# Patient Record
Sex: FEMALE | Race: WHITE | NOT HISPANIC OR LATINO | URBAN - METROPOLITAN AREA
[De-identification: names, ages, dates, MRNs, and addresses within clinical notes are randomized per-mention and may not be internally consistent; named-entity substitution may affect disease eponyms.]

---

## 2017-11-24 ENCOUNTER — OUTPATIENT (OUTPATIENT)
Dept: OUTPATIENT SERVICES | Facility: HOSPITAL | Age: 82
LOS: 1 days | Discharge: HOME | End: 2017-11-24
Payer: MEDICARE

## 2017-11-24 VITALS
RESPIRATION RATE: 17 BRPM | DIASTOLIC BLOOD PRESSURE: 66 MMHG | HEIGHT: 63 IN | WEIGHT: 123.02 LBS | HEART RATE: 65 BPM | OXYGEN SATURATION: 97 % | SYSTOLIC BLOOD PRESSURE: 119 MMHG | TEMPERATURE: 98 F

## 2017-11-24 DIAGNOSIS — S72.142D DISPLACED INTERTROCHANTERIC FRACTURE OF LEFT FEMUR, SUBSEQUENT ENCOUNTER FOR CLOSED FRACTURE WITH ROUTINE HEALING: ICD-10-CM

## 2017-11-24 DIAGNOSIS — Z95.828 PRESENCE OF OTHER VASCULAR IMPLANTS AND GRAFTS: Chronic | ICD-10-CM

## 2017-11-24 DIAGNOSIS — Z90.710 ACQUIRED ABSENCE OF BOTH CERVIX AND UTERUS: Chronic | ICD-10-CM

## 2017-11-24 DIAGNOSIS — Z01.818 ENCOUNTER FOR OTHER PREPROCEDURAL EXAMINATION: ICD-10-CM

## 2017-11-24 DIAGNOSIS — Z98.1 ARTHRODESIS STATUS: Chronic | ICD-10-CM

## 2017-11-24 DIAGNOSIS — M25.552 PAIN IN LEFT HIP: Chronic | ICD-10-CM

## 2017-11-24 LAB
ANION GAP SERPL CALC-SCNC: 5 MMOL/L — SIGNIFICANT CHANGE UP (ref 5–17)
APTT BLD: 27.4 SEC — LOW (ref 27.5–37.4)
BASOPHILS # BLD AUTO: 0 K/UL — SIGNIFICANT CHANGE UP (ref 0–0.2)
BASOPHILS NFR BLD AUTO: 0.7 % — SIGNIFICANT CHANGE UP (ref 0–2)
BUN SERPL-MCNC: 26 MG/DL — HIGH (ref 7–23)
CALCIUM SERPL-MCNC: 8.9 MG/DL — SIGNIFICANT CHANGE UP (ref 8.5–10.1)
CHLORIDE SERPL-SCNC: 103 MMOL/L — SIGNIFICANT CHANGE UP (ref 96–108)
CO2 SERPL-SCNC: 31 MMOL/L — SIGNIFICANT CHANGE UP (ref 22–31)
CREAT SERPL-MCNC: 0.64 MG/DL — SIGNIFICANT CHANGE UP (ref 0.5–1.3)
EOSINOPHIL # BLD AUTO: 0.1 K/UL — SIGNIFICANT CHANGE UP (ref 0–0.5)
EOSINOPHIL NFR BLD AUTO: 1.2 % — SIGNIFICANT CHANGE UP (ref 0–6)
GLUCOSE SERPL-MCNC: 84 MG/DL — SIGNIFICANT CHANGE UP (ref 70–99)
HBA1C BLD-MCNC: 5.2 % — SIGNIFICANT CHANGE UP (ref 4–5.6)
HCT VFR BLD CALC: 38.7 % — SIGNIFICANT CHANGE UP (ref 34.5–45)
HGB BLD-MCNC: 12.6 G/DL — SIGNIFICANT CHANGE UP (ref 11.5–15.5)
INR BLD: 0.97 RATIO — SIGNIFICANT CHANGE UP (ref 0.88–1.16)
LYMPHOCYTES # BLD AUTO: 1.3 K/UL — SIGNIFICANT CHANGE UP (ref 1–3.3)
LYMPHOCYTES # BLD AUTO: 17.4 % — SIGNIFICANT CHANGE UP (ref 13–44)
MCHC RBC-ENTMCNC: 32.5 GM/DL — SIGNIFICANT CHANGE UP (ref 32–36)
MCHC RBC-ENTMCNC: 33.3 PG — SIGNIFICANT CHANGE UP (ref 27–34)
MCV RBC AUTO: 102.5 FL — HIGH (ref 80–100)
MONOCYTES # BLD AUTO: 0.8 K/UL — SIGNIFICANT CHANGE UP (ref 0–0.9)
MONOCYTES NFR BLD AUTO: 11.5 % — SIGNIFICANT CHANGE UP (ref 2–14)
NEUTROPHILS # BLD AUTO: 5 K/UL — SIGNIFICANT CHANGE UP (ref 1.8–7.4)
NEUTROPHILS NFR BLD AUTO: 69.2 % — SIGNIFICANT CHANGE UP (ref 43–77)
PLATELET # BLD AUTO: 286 K/UL — SIGNIFICANT CHANGE UP (ref 150–400)
POTASSIUM SERPL-MCNC: 4.3 MMOL/L — SIGNIFICANT CHANGE UP (ref 3.5–5.3)
POTASSIUM SERPL-SCNC: 4.3 MMOL/L — SIGNIFICANT CHANGE UP (ref 3.5–5.3)
PROTHROM AB SERPL-ACNC: 10.6 SEC — SIGNIFICANT CHANGE UP (ref 9.8–12.7)
RBC # BLD: 3.77 M/UL — LOW (ref 3.8–5.2)
RBC # FLD: 12.1 % — SIGNIFICANT CHANGE UP (ref 11–15)
SODIUM SERPL-SCNC: 139 MMOL/L — SIGNIFICANT CHANGE UP (ref 135–145)
WBC # BLD: 7.2 K/UL — SIGNIFICANT CHANGE UP (ref 3.8–10.5)
WBC # FLD AUTO: 7.2 K/UL — SIGNIFICANT CHANGE UP (ref 3.8–10.5)

## 2017-11-24 PROCEDURE — 93010 ELECTROCARDIOGRAM REPORT: CPT | Mod: NC

## 2017-11-24 RX ORDER — MUPIROCIN 20 MG/G
1 OINTMENT TOPICAL
Qty: 1 | Refills: 0 | OUTPATIENT
Start: 2017-11-24 | End: 2017-11-29

## 2017-11-24 NOTE — H&P PST ADULT - NSANTHOSAYNRD_GEN_A_CORE
No. HERMILA screening performed.  STOP BANG Legend: 0-2 = LOW Risk; 3-4 = INTERMEDIATE Risk; 5-8 = HIGH Risk

## 2017-11-24 NOTE — PATIENT PROFILE ADULT. - PSH
H/O arterial bypass of lower limb    Pain of left hip  left hip pinning  may 19.2017  S/P cervical spinal fusion    Status post vaginal hysterectomy  1970

## 2017-11-25 ENCOUNTER — INPATIENT (INPATIENT)
Facility: HOSPITAL | Age: 82
LOS: 5 days | Discharge: NURSING, REHAB OR LONG-TERM CARE | End: 2017-12-01
Attending: ORTHOPAEDIC SURGERY | Admitting: ORTHOPAEDIC SURGERY
Payer: MEDICARE

## 2017-11-25 VITALS
OXYGEN SATURATION: 97 % | DIASTOLIC BLOOD PRESSURE: 60 MMHG | RESPIRATION RATE: 19 BRPM | TEMPERATURE: 98 F | HEIGHT: 63 IN | SYSTOLIC BLOOD PRESSURE: 111 MMHG | WEIGHT: 125 LBS | HEART RATE: 69 BPM

## 2017-11-25 DIAGNOSIS — Z95.828 PRESENCE OF OTHER VASCULAR IMPLANTS AND GRAFTS: Chronic | ICD-10-CM

## 2017-11-25 DIAGNOSIS — Z98.1 ARTHRODESIS STATUS: Chronic | ICD-10-CM

## 2017-11-25 DIAGNOSIS — Z90.710 ACQUIRED ABSENCE OF BOTH CERVIX AND UTERUS: Chronic | ICD-10-CM

## 2017-11-25 DIAGNOSIS — M25.552 PAIN IN LEFT HIP: Chronic | ICD-10-CM

## 2017-11-25 LAB
ALBUMIN SERPL ELPH-MCNC: 3.2 G/DL — LOW (ref 3.3–5)
ALP SERPL-CCNC: 63 U/L — SIGNIFICANT CHANGE UP (ref 40–120)
ALT FLD-CCNC: 32 U/L — SIGNIFICANT CHANGE UP (ref 12–78)
ANION GAP SERPL CALC-SCNC: 5 MMOL/L — SIGNIFICANT CHANGE UP (ref 5–17)
APTT BLD: 27 SEC — LOW (ref 27.5–37.4)
AST SERPL-CCNC: 23 U/L — SIGNIFICANT CHANGE UP (ref 15–37)
BASOPHILS # BLD AUTO: 0 K/UL — SIGNIFICANT CHANGE UP (ref 0–0.2)
BASOPHILS NFR BLD AUTO: 0.6 % — SIGNIFICANT CHANGE UP (ref 0–2)
BILIRUB SERPL-MCNC: 0.3 MG/DL — SIGNIFICANT CHANGE UP (ref 0.2–1.2)
BLD GP AB SCN SERPL QL: SIGNIFICANT CHANGE UP
BUN SERPL-MCNC: 19 MG/DL — SIGNIFICANT CHANGE UP (ref 7–23)
CALCIUM SERPL-MCNC: 8.9 MG/DL — SIGNIFICANT CHANGE UP (ref 8.5–10.1)
CHLORIDE SERPL-SCNC: 103 MMOL/L — SIGNIFICANT CHANGE UP (ref 96–108)
CO2 SERPL-SCNC: 32 MMOL/L — HIGH (ref 22–31)
CREAT SERPL-MCNC: 0.72 MG/DL — SIGNIFICANT CHANGE UP (ref 0.5–1.3)
EOSINOPHIL # BLD AUTO: 0.1 K/UL — SIGNIFICANT CHANGE UP (ref 0–0.5)
EOSINOPHIL NFR BLD AUTO: 1.1 % — SIGNIFICANT CHANGE UP (ref 0–6)
GLUCOSE SERPL-MCNC: 112 MG/DL — HIGH (ref 70–99)
HCT VFR BLD CALC: 37.6 % — SIGNIFICANT CHANGE UP (ref 34.5–45)
HGB BLD-MCNC: 12.6 G/DL — SIGNIFICANT CHANGE UP (ref 11.5–15.5)
INR BLD: 1.03 RATIO — SIGNIFICANT CHANGE UP (ref 0.88–1.16)
LYMPHOCYTES # BLD AUTO: 1.1 K/UL — SIGNIFICANT CHANGE UP (ref 1–3.3)
LYMPHOCYTES # BLD AUTO: 16.1 % — SIGNIFICANT CHANGE UP (ref 13–44)
MCHC RBC-ENTMCNC: 33.4 GM/DL — SIGNIFICANT CHANGE UP (ref 32–36)
MCHC RBC-ENTMCNC: 33.6 PG — SIGNIFICANT CHANGE UP (ref 27–34)
MCV RBC AUTO: 100.6 FL — HIGH (ref 80–100)
MONOCYTES # BLD AUTO: 0.6 K/UL — SIGNIFICANT CHANGE UP (ref 0–0.9)
MONOCYTES NFR BLD AUTO: 8.7 % — SIGNIFICANT CHANGE UP (ref 2–14)
MRSA PCR RESULT.: DETECTED
NEUTROPHILS # BLD AUTO: 4.9 K/UL — SIGNIFICANT CHANGE UP (ref 1.8–7.4)
NEUTROPHILS NFR BLD AUTO: 73.4 % — SIGNIFICANT CHANGE UP (ref 43–77)
PLATELET # BLD AUTO: 303 K/UL — SIGNIFICANT CHANGE UP (ref 150–400)
POTASSIUM SERPL-MCNC: 3.9 MMOL/L — SIGNIFICANT CHANGE UP (ref 3.5–5.3)
POTASSIUM SERPL-SCNC: 3.9 MMOL/L — SIGNIFICANT CHANGE UP (ref 3.5–5.3)
PROT SERPL-MCNC: 6.8 GM/DL — SIGNIFICANT CHANGE UP (ref 6–8.3)
PROTHROM AB SERPL-ACNC: 11.2 SEC — SIGNIFICANT CHANGE UP (ref 9.8–12.7)
RBC # BLD: 3.73 M/UL — LOW (ref 3.8–5.2)
RBC # FLD: 11.9 % — SIGNIFICANT CHANGE UP (ref 11–15)
S AUREUS DNA NOSE QL NAA+PROBE: DETECTED
SODIUM SERPL-SCNC: 140 MMOL/L — SIGNIFICANT CHANGE UP (ref 135–145)
WBC # BLD: 6.7 K/UL — SIGNIFICANT CHANGE UP (ref 3.8–10.5)
WBC # FLD AUTO: 6.7 K/UL — SIGNIFICANT CHANGE UP (ref 3.8–10.5)

## 2017-11-25 PROCEDURE — 71010: CPT | Mod: 26

## 2017-11-25 PROCEDURE — 99285 EMERGENCY DEPT VISIT HI MDM: CPT

## 2017-11-25 PROCEDURE — 73502 X-RAY EXAM HIP UNI 2-3 VIEWS: CPT | Mod: 26,LT

## 2017-11-25 RX ORDER — TRAMADOL HYDROCHLORIDE 50 MG/1
1 TABLET ORAL
Qty: 0 | Refills: 0 | COMMUNITY

## 2017-11-25 RX ORDER — TRAMADOL HYDROCHLORIDE 50 MG/1
0 TABLET ORAL
Qty: 0 | Refills: 0 | COMMUNITY

## 2017-11-25 RX ORDER — ENOXAPARIN SODIUM 100 MG/ML
40 INJECTION SUBCUTANEOUS DAILY
Qty: 0 | Refills: 0 | Status: COMPLETED | OUTPATIENT
Start: 2017-11-26 | End: 2017-11-27

## 2017-11-25 RX ORDER — MORPHINE SULFATE 50 MG/1
2 CAPSULE, EXTENDED RELEASE ORAL EVERY 6 HOURS
Qty: 0 | Refills: 0 | Status: DISCONTINUED | OUTPATIENT
Start: 2017-11-25 | End: 2017-11-26

## 2017-11-25 RX ORDER — MORPHINE SULFATE 50 MG/1
2 CAPSULE, EXTENDED RELEASE ORAL ONCE
Qty: 0 | Refills: 0 | Status: DISCONTINUED | OUTPATIENT
Start: 2017-11-25 | End: 2017-11-25

## 2017-11-25 RX ORDER — ACETAMINOPHEN 500 MG
650 TABLET ORAL EVERY 6 HOURS
Qty: 0 | Refills: 0 | Status: DISCONTINUED | OUTPATIENT
Start: 2017-11-25 | End: 2017-11-28

## 2017-11-25 RX ORDER — MULTIVIT-MIN/FERROUS GLUCONATE 9 MG/15 ML
1 LIQUID (ML) ORAL
Qty: 0 | Refills: 0 | COMMUNITY

## 2017-11-25 RX ORDER — MUPIROCIN 20 MG/G
1 OINTMENT TOPICAL
Qty: 0 | Refills: 0 | Status: DISCONTINUED | OUTPATIENT
Start: 2017-11-25 | End: 2017-11-28

## 2017-11-25 RX ORDER — ONDANSETRON 8 MG/1
4 TABLET, FILM COATED ORAL ONCE
Qty: 0 | Refills: 0 | Status: COMPLETED | OUTPATIENT
Start: 2017-11-25 | End: 2017-11-25

## 2017-11-25 RX ORDER — DONEPEZIL HYDROCHLORIDE 10 MG/1
5 TABLET, FILM COATED ORAL AT BEDTIME
Qty: 0 | Refills: 0 | Status: DISCONTINUED | OUTPATIENT
Start: 2017-11-25 | End: 2017-11-28

## 2017-11-25 RX ORDER — HEPARIN SODIUM 5000 [USP'U]/ML
5000 INJECTION INTRAVENOUS; SUBCUTANEOUS EVERY 12 HOURS
Qty: 0 | Refills: 0 | Status: DISCONTINUED | OUTPATIENT
Start: 2017-11-25 | End: 2017-11-25

## 2017-11-25 RX ORDER — LEVOTHYROXINE SODIUM 125 MCG
50 TABLET ORAL DAILY
Qty: 0 | Refills: 0 | Status: DISCONTINUED | OUTPATIENT
Start: 2017-11-25 | End: 2017-11-28

## 2017-11-25 RX ORDER — TRAMADOL HYDROCHLORIDE 50 MG/1
50 TABLET ORAL ONCE
Qty: 0 | Refills: 0 | Status: DISCONTINUED | OUTPATIENT
Start: 2017-11-25 | End: 2017-11-25

## 2017-11-25 RX ADMIN — HEPARIN SODIUM 5000 UNIT(S): 5000 INJECTION INTRAVENOUS; SUBCUTANEOUS at 18:58

## 2017-11-25 RX ADMIN — Medication 7.5 MILLIGRAM(S): at 18:59

## 2017-11-25 RX ADMIN — Medication 650 MILLIGRAM(S): at 20:47

## 2017-11-25 RX ADMIN — MORPHINE SULFATE 2 MILLIGRAM(S): 50 CAPSULE, EXTENDED RELEASE ORAL at 18:26

## 2017-11-25 RX ADMIN — TRAMADOL HYDROCHLORIDE 50 MILLIGRAM(S): 50 TABLET ORAL at 13:08

## 2017-11-25 RX ADMIN — ONDANSETRON 4 MILLIGRAM(S): 8 TABLET, FILM COATED ORAL at 12:50

## 2017-11-25 RX ADMIN — MORPHINE SULFATE 2 MILLIGRAM(S): 50 CAPSULE, EXTENDED RELEASE ORAL at 18:28

## 2017-11-25 RX ADMIN — Medication 650 MILLIGRAM(S): at 21:47

## 2017-11-25 RX ADMIN — MORPHINE SULFATE 2 MILLIGRAM(S): 50 CAPSULE, EXTENDED RELEASE ORAL at 15:36

## 2017-11-25 RX ADMIN — DONEPEZIL HYDROCHLORIDE 5 MILLIGRAM(S): 10 TABLET, FILM COATED ORAL at 21:56

## 2017-11-25 RX ADMIN — MUPIROCIN 1 APPLICATION(S): 20 OINTMENT TOPICAL at 18:59

## 2017-11-25 NOTE — ED PROVIDER NOTE - OBJECTIVE STATEMENT
82 year old female with PMH of hypothyroid, dementia, otherwise mild COPD presenting for L hip pain, has been having issue with it for past few weeks, noted to have left hip prosthesis but worsening giancarlo that has slipped and poking into pelvis - plan was for surgical repair of hip with total hip replacement with Dr. Rodriguez otherwise here for pain control.

## 2017-11-25 NOTE — ED ADULT TRIAGE NOTE - CHIEF COMPLAINT QUOTE
patient c/o of L hip pain , patient had a fall last night denied hitting head , patient stated she will have a L hip surgery  next week

## 2017-11-25 NOTE — ED ADULT NURSE NOTE - OBJECTIVE STATEMENT
pt states  she was trying to sit when slide down the chair and hurt her left hip, had surgery in May for the same hip unable to bear weight, was supposed to get total hip replacement on Tuesday

## 2017-11-25 NOTE — ED PROVIDER NOTE - CARE PLAN
Principal Discharge DX:	Prosthetic hip implant failure, initial encounter  Secondary Diagnosis:	Hip pain, left

## 2017-11-25 NOTE — H&P ADULT - ATTENDING COMMENTS
Seen and examined this morning. Ongoing pain L hip from failed TFN, plan for conversion surgery to L hip valdo, possible JONA and removal of hardware. She was admitted due to worsening pain. Plan to proceed with surgery today.     Mike Rodriguez MD

## 2017-11-25 NOTE — H&P ADULT - NSHPPHYSICALEXAM_GEN_ALL_CORE
Vital Signs Last 24 Hrs  T(C): 36.3 (25 Nov 2017 19:53), Max: 36.9 (25 Nov 2017 15:59)  T(F): 97.3 (25 Nov 2017 19:53), Max: 98.4 (25 Nov 2017 15:59)  HR: 79 (25 Nov 2017 19:53) (69 - 79)  BP: 124/59 (25 Nov 2017 19:53) (99/51 - 124/59)  RR: 17 (25 Nov 2017 19:53) (17 - 19)  SpO2: 96% (25 Nov 2017 19:53) (95% - 97%)    Physical Exam:  General:  Appears stated age, well-groomed, well-nourished, no distress  Eyes : EOMI  Chest:  CTA B/L  Cardiovascular:  S1S2; Regular rate & rhythm  Abdomen:  soft, NT/ND  Extremities:  LLE pain, NVI  Skin:  No rash

## 2017-11-25 NOTE — H&P ADULT - ASSESSMENT
82 year old female with left hip pain due to previous fracture and fall, PMH COPD and hypothyroidism	    - admit for pain control  - OR planned for left JONA for Tuesday  - will obtain preop labs prior to OR  - medicine paged for consult for preop clearance and optimization  - NWB on LLE  - Lovenox for DVT ppx, will hold on Tuesday morning  - Regular diet  - Discussed with Dr. Rodriguez

## 2017-11-25 NOTE — H&P ADULT - NSHPLABSRESULTS_GEN_ALL_CORE
LABS:                        12.6   6.7   )-----------( 303      ( 25 Nov 2017 13:24 )             37.6     11-25    140  |  103  |  19  ----------------------------<  112<H>  3.9   |  32<H>  |  0.72    Ca    8.9      25 Nov 2017 13:24    TPro  6.8  /  Alb  3.2<L>  /  TBili  0.3  /  DBili  x   /  AST  23  /  ALT  32  /  AlkPhos  63  11-25    PT/INR - ( 25 Nov 2017 13:24 )   PT: 11.2 sec;   INR: 1.03 ratio      PTT - ( 25 Nov 2017 13:24 )  PTT:27.0 sec    RADIOLOGY & ADDITIONAL STUDIES:    from: Xray Hip w/ Pelvis 2 or 3 Views, Left (11.25.17 @ 13:53):  No acute fracture or dislocation of the pelvis or left hip. If there is persistent concern for a hip fracture, an MRI may be obtained.    from: Xray Chest 1 View AP/PA. (11.25.17 @ 13:53):  No focal consolidation or pleural effusion.

## 2017-11-25 NOTE — H&P ADULT - HISTORY OF PRESENT ILLNESS
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 Patient was scheduled to have her a JONA on this Tuesday 11/28 and was seen at PST on 11/24. Patient states that last night she slipped from a chair and since then her pain has been much worse and she is unable to ambulate. Pain is 9/10, feels like a shooting pain to her knee. Patient usually ambulates with a walker. Denies chest pain, SOB, dysuria, nausea/ vomiting, diarrhea.

## 2017-11-26 LAB
ANION GAP SERPL CALC-SCNC: 7 MMOL/L — SIGNIFICANT CHANGE UP (ref 5–17)
BUN SERPL-MCNC: 26 MG/DL — HIGH (ref 7–23)
CALCIUM SERPL-MCNC: 8.5 MG/DL — SIGNIFICANT CHANGE UP (ref 8.5–10.1)
CHLORIDE SERPL-SCNC: 105 MMOL/L — SIGNIFICANT CHANGE UP (ref 96–108)
CO2 SERPL-SCNC: 28 MMOL/L — SIGNIFICANT CHANGE UP (ref 22–31)
CREAT SERPL-MCNC: 0.82 MG/DL — SIGNIFICANT CHANGE UP (ref 0.5–1.3)
GLUCOSE SERPL-MCNC: 80 MG/DL — SIGNIFICANT CHANGE UP (ref 70–99)
HCT VFR BLD CALC: 36 % — SIGNIFICANT CHANGE UP (ref 34.5–45)
HGB BLD-MCNC: 11.8 G/DL — SIGNIFICANT CHANGE UP (ref 11.5–15.5)
MCHC RBC-ENTMCNC: 32.7 GM/DL — SIGNIFICANT CHANGE UP (ref 32–36)
MCHC RBC-ENTMCNC: 33.4 PG — SIGNIFICANT CHANGE UP (ref 27–34)
MCV RBC AUTO: 102.1 FL — HIGH (ref 80–100)
PLATELET # BLD AUTO: 280 K/UL — SIGNIFICANT CHANGE UP (ref 150–400)
POTASSIUM SERPL-MCNC: 4.2 MMOL/L — SIGNIFICANT CHANGE UP (ref 3.5–5.3)
POTASSIUM SERPL-SCNC: 4.2 MMOL/L — SIGNIFICANT CHANGE UP (ref 3.5–5.3)
RBC # BLD: 3.52 M/UL — LOW (ref 3.8–5.2)
RBC # FLD: 12 % — SIGNIFICANT CHANGE UP (ref 11–15)
SODIUM SERPL-SCNC: 140 MMOL/L — SIGNIFICANT CHANGE UP (ref 135–145)
WBC # BLD: 6.8 K/UL — SIGNIFICANT CHANGE UP (ref 3.8–10.5)
WBC # FLD AUTO: 6.8 K/UL — SIGNIFICANT CHANGE UP (ref 3.8–10.5)

## 2017-11-26 PROCEDURE — 99223 1ST HOSP IP/OBS HIGH 75: CPT

## 2017-11-26 RX ORDER — OXYCODONE HYDROCHLORIDE 5 MG/1
10 TABLET ORAL EVERY 6 HOURS
Qty: 0 | Refills: 0 | Status: DISCONTINUED | OUTPATIENT
Start: 2017-11-26 | End: 2017-11-28

## 2017-11-26 RX ORDER — MORPHINE SULFATE 50 MG/1
4 CAPSULE, EXTENDED RELEASE ORAL EVERY 6 HOURS
Qty: 0 | Refills: 0 | Status: DISCONTINUED | OUTPATIENT
Start: 2017-11-26 | End: 2017-11-27

## 2017-11-26 RX ADMIN — OXYCODONE HYDROCHLORIDE 10 MILLIGRAM(S): 5 TABLET ORAL at 12:15

## 2017-11-26 RX ADMIN — ENOXAPARIN SODIUM 40 MILLIGRAM(S): 100 INJECTION SUBCUTANEOUS at 12:15

## 2017-11-26 RX ADMIN — OXYCODONE HYDROCHLORIDE 10 MILLIGRAM(S): 5 TABLET ORAL at 22:37

## 2017-11-26 RX ADMIN — Medication 50 MICROGRAM(S): at 05:04

## 2017-11-26 RX ADMIN — MORPHINE SULFATE 4 MILLIGRAM(S): 50 CAPSULE, EXTENDED RELEASE ORAL at 17:22

## 2017-11-26 RX ADMIN — OXYCODONE HYDROCHLORIDE 10 MILLIGRAM(S): 5 TABLET ORAL at 21:37

## 2017-11-26 RX ADMIN — MUPIROCIN 1 APPLICATION(S): 20 OINTMENT TOPICAL at 18:29

## 2017-11-26 RX ADMIN — MORPHINE SULFATE 2 MILLIGRAM(S): 50 CAPSULE, EXTENDED RELEASE ORAL at 03:33

## 2017-11-26 RX ADMIN — Medication 7.5 MILLIGRAM(S): at 18:32

## 2017-11-26 RX ADMIN — MUPIROCIN 1 APPLICATION(S): 20 OINTMENT TOPICAL at 05:03

## 2017-11-26 RX ADMIN — OXYCODONE HYDROCHLORIDE 10 MILLIGRAM(S): 5 TABLET ORAL at 12:40

## 2017-11-26 RX ADMIN — DONEPEZIL HYDROCHLORIDE 5 MILLIGRAM(S): 10 TABLET, FILM COATED ORAL at 21:38

## 2017-11-26 RX ADMIN — Medication 7.5 MILLIGRAM(S): at 05:04

## 2017-11-26 RX ADMIN — MORPHINE SULFATE 2 MILLIGRAM(S): 50 CAPSULE, EXTENDED RELEASE ORAL at 03:48

## 2017-11-26 RX ADMIN — MORPHINE SULFATE 2 MILLIGRAM(S): 50 CAPSULE, EXTENDED RELEASE ORAL at 09:21

## 2017-11-26 RX ADMIN — MORPHINE SULFATE 2 MILLIGRAM(S): 50 CAPSULE, EXTENDED RELEASE ORAL at 09:12

## 2017-11-26 NOTE — CONSULT NOTE ADULT - ASSESSMENT
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 Patient was scheduled to have her a JONA on this Tuesday 11/28 and was seen at PST on 11/24. Patient states that last night she slipped from a chair and since then her pain has been much worse and she is unable to ambulate. Pain is 9/10, feels like a shooting pain to her knee. Patient usually ambulates with a walker.       Patient with no hx of heart disease, stable copd. Denies any symptoms except pain in hip. EKG reviewed NSR @66  She is medically optimized for L JONA on tuesday  C/w synthroid and pain control  History of dementia, c/w home meds. At higher risk for post op delirium, monitor closely, reorient avoid, sedating medications     Estimated Risk Probability for Perioperative Myocardial Infarction or Cardiac Arrest based on gabriel score is   0.42 %

## 2017-11-26 NOTE — CONSULT NOTE ADULT - SUBJECTIVE AND OBJECTIVE BOX
HPI:  82 year old female with PMH of hypothyroidism and COPD and PSH of left intermedullary giancarlo s/p hip fracture in 05/2017. Patient was scheduled to have her a JONA on this Tuesday 11/28 and was seen at PST on 11/24. Patient states that last night she slipped from a chair and since then her pain has been much worse and she is unable to ambulate. Pain is 9/10, feels like a shooting pain to her knee. Patient usually ambulates with a walker. Denies chest pain, SOB, dysuria, nausea/ vomiting, diarrhea. (25 Nov 2017 20:12)      PAST MEDICAL & SURGICAL HISTORY:  Chronic obstructive pulmonary disease  Hypothyroid  Neuropathic pain  Pain of left hip: left hip pinning  may 19.2017  Status post vaginal hysterectomy: 1970  S/P cervical spinal fusion  H/O arterial bypass of lower limb      REVIEW OF SYSTEMS:    CONSTITUTIONAL: No fever, weight loss, or fatigue  EYES: No eye pain, visual disturbances, or discharge  ENMT:  No difficulty hearing, tinnitus, vertigo; No sinus or throat pain  NECK: No pain or stiffness  BREASTS: No pain, masses, or nipple discharge  RESPIRATORY: No cough, wheezing, chills or hemoptysis; No shortness of breath  CARDIOVASCULAR: No chest pain, palpitations, dizziness, or leg swelling  GASTROINTESTINAL: No abdominal or epigastric pain. No nausea, vomiting, or hematemesis; No diarrhea or constipation. No melena or hematochezia.  GENITOURINARY: No dysuria, frequency, hematuria, or incontinence  NEUROLOGICAL: No headaches, memory loss, loss of strength, numbness, or tremors  SKIN: No itching, burning, rashes, or lesions   LYMPH NODES: No enlarged glands  ENDOCRINE: No heat or cold intolerance; No hair loss  MUSCULOSKELETAL: No joint pain or swelling; No muscle, back, or extremity pain  PSYCHIATRIC: No depression, anxiety, mood swings, or difficulty sleeping  HEME/LYMPH: No easy bruising, or bleeding gums  ALLERGY AND IMMUNOLOGIC: No hives or eczema      MEDICATIONS  (STANDING):  busPIRone 7.5 milliGRAM(s) Oral two times a day  donepezil 5 milliGRAM(s) Oral at bedtime  enoxaparin Injectable 40 milliGRAM(s) SubCutaneous daily  levothyroxine 50 MICROGram(s) Oral daily  mupirocin 2% Ointment 1 Application(s) Topical two times a day    MEDICATIONS  (PRN):  acetaminophen   Tablet. 650 milliGRAM(s) Oral every 6 hours PRN Mild Pain (1 - 3)  morphine  - Injectable 4 milliGRAM(s) IV Push every 6 hours PRN Severe Pain (7 - 10)  oxyCODONE    IR 10 milliGRAM(s) Oral every 6 hours PRN Moderate Pain (4 - 6)      Allergies    No Known Allergies    Intolerances        SOCIAL HISTORY:    FAMILY HISTORY:      Vital Signs Last 24 Hrs  T(C): 37.1 (26 Nov 2017 05:05), Max: 37.1 (26 Nov 2017 05:05)  T(F): 98.8 (26 Nov 2017 05:05), Max: 98.8 (26 Nov 2017 05:05)  HR: 77 (26 Nov 2017 05:05) (69 - 79)  BP: 114/64 (26 Nov 2017 05:05) (99/51 - 124/59)  BP(mean): --  RR: 18 (26 Nov 2017 05:05) (17 - 19)  SpO2: 97% (26 Nov 2017 05:05) (92% - 97%)    PHYSICAL EXAM:    GENERAL: NAD, well-groomed, well-developed  HEAD:  Atraumatic, Normocephalic  EYES: EOMI, PERRLA, conjunctiva and sclera clear  ENMT: No tonsillar erythema, exudates, or enlargement; Moist mucous membranes, Good dentition, No lesions  NECK: Supple, No JVD, Normal thyroid  NERVOUS SYSTEM:  Alert & Oriented X3, Good concentration; Motor Strength 5/5 B/L upper and lower extremities; DTRs 2+ intact and symmetric  CHEST/LUNG: Clear to percussion bilaterally; No rales, rhonchi, wheezing, or rubs  HEART: Regular rate and rhythm; No murmurs, rubs, or gallops  ABDOMEN: Soft, Nontender, Nondistended; Bowel sounds present  EXTREMITIES:  2+ Peripheral Pulses, No clubbing, cyanosis, or edema  LYMPH: No lymphadenopathy noted        LABS:                        11.8   6.8   )-----------( 280      ( 26 Nov 2017 06:39 )             36.0     11-26    140  |  105  |  26<H>  ----------------------------<  80  4.2   |  28  |  0.82    Ca    8.5      26 Nov 2017 06:39    TPro  6.8  /  Alb  3.2<L>  /  TBili  0.3  /  DBili  x   /  AST  23  /  ALT  32  /  AlkPhos  63  11-25    PT/INR - ( 25 Nov 2017 13:24 )   PT: 11.2 sec;   INR: 1.03 ratio         PTT - ( 25 Nov 2017 13:24 )  PTT:27.0 sec      RADIOLOGY & ADDITIONAL STUDIES:

## 2017-11-27 DIAGNOSIS — M25.552 PAIN IN LEFT HIP: ICD-10-CM

## 2017-11-27 DIAGNOSIS — E03.9 HYPOTHYROIDISM, UNSPECIFIED: ICD-10-CM

## 2017-11-27 DIAGNOSIS — J44.9 CHRONIC OBSTRUCTIVE PULMONARY DISEASE, UNSPECIFIED: ICD-10-CM

## 2017-11-27 LAB
ANION GAP SERPL CALC-SCNC: 7 MMOL/L — SIGNIFICANT CHANGE UP (ref 5–17)
BUN SERPL-MCNC: 24 MG/DL — HIGH (ref 7–23)
CALCIUM SERPL-MCNC: 8.3 MG/DL — LOW (ref 8.5–10.1)
CHLORIDE SERPL-SCNC: 106 MMOL/L — SIGNIFICANT CHANGE UP (ref 96–108)
CO2 SERPL-SCNC: 27 MMOL/L — SIGNIFICANT CHANGE UP (ref 22–31)
CREAT SERPL-MCNC: 0.68 MG/DL — SIGNIFICANT CHANGE UP (ref 0.5–1.3)
GLUCOSE SERPL-MCNC: 87 MG/DL — SIGNIFICANT CHANGE UP (ref 70–99)
HCT VFR BLD CALC: 35 % — SIGNIFICANT CHANGE UP (ref 34.5–45)
HGB BLD-MCNC: 11.5 G/DL — SIGNIFICANT CHANGE UP (ref 11.5–15.5)
MCHC RBC-ENTMCNC: 32.9 GM/DL — SIGNIFICANT CHANGE UP (ref 32–36)
MCHC RBC-ENTMCNC: 33.1 PG — SIGNIFICANT CHANGE UP (ref 27–34)
MCV RBC AUTO: 100.6 FL — HIGH (ref 80–100)
PLATELET # BLD AUTO: 280 K/UL — SIGNIFICANT CHANGE UP (ref 150–400)
POTASSIUM SERPL-MCNC: 4.1 MMOL/L — SIGNIFICANT CHANGE UP (ref 3.5–5.3)
POTASSIUM SERPL-SCNC: 4.1 MMOL/L — SIGNIFICANT CHANGE UP (ref 3.5–5.3)
RBC # BLD: 3.48 M/UL — LOW (ref 3.8–5.2)
RBC # FLD: 11.7 % — SIGNIFICANT CHANGE UP (ref 11–15)
SODIUM SERPL-SCNC: 140 MMOL/L — SIGNIFICANT CHANGE UP (ref 135–145)
WBC # BLD: 7.6 K/UL — SIGNIFICANT CHANGE UP (ref 3.8–10.5)
WBC # FLD AUTO: 7.6 K/UL — SIGNIFICANT CHANGE UP (ref 3.8–10.5)

## 2017-11-27 PROCEDURE — 99233 SBSQ HOSP IP/OBS HIGH 50: CPT

## 2017-11-27 RX ORDER — TRAMADOL HYDROCHLORIDE 50 MG/1
50 TABLET ORAL ONCE
Qty: 0 | Refills: 0 | Status: DISCONTINUED | OUTPATIENT
Start: 2017-11-27 | End: 2017-11-27

## 2017-11-27 RX ORDER — SODIUM CHLORIDE 9 MG/ML
1000 INJECTION, SOLUTION INTRAVENOUS
Qty: 0 | Refills: 0 | Status: DISCONTINUED | OUTPATIENT
Start: 2017-11-27 | End: 2017-11-28

## 2017-11-27 RX ORDER — ACETAMINOPHEN 500 MG
1000 TABLET ORAL ONCE
Qty: 0 | Refills: 0 | Status: COMPLETED | OUTPATIENT
Start: 2017-11-27 | End: 2017-11-27

## 2017-11-27 RX ORDER — DONEPEZIL HYDROCHLORIDE 10 MG/1
1 TABLET, FILM COATED ORAL
Qty: 0 | Refills: 0 | COMMUNITY

## 2017-11-27 RX ORDER — ONDANSETRON 8 MG/1
4 TABLET, FILM COATED ORAL EVERY 8 HOURS
Qty: 0 | Refills: 0 | Status: DISCONTINUED | OUTPATIENT
Start: 2017-11-27 | End: 2017-11-28

## 2017-11-27 RX ADMIN — Medication 1000 MILLIGRAM(S): at 19:31

## 2017-11-27 RX ADMIN — MORPHINE SULFATE 4 MILLIGRAM(S): 50 CAPSULE, EXTENDED RELEASE ORAL at 01:26

## 2017-11-27 RX ADMIN — Medication 400 MILLIGRAM(S): at 19:01

## 2017-11-27 RX ADMIN — DONEPEZIL HYDROCHLORIDE 5 MILLIGRAM(S): 10 TABLET, FILM COATED ORAL at 21:30

## 2017-11-27 RX ADMIN — ENOXAPARIN SODIUM 40 MILLIGRAM(S): 100 INJECTION SUBCUTANEOUS at 13:03

## 2017-11-27 RX ADMIN — Medication 7.5 MILLIGRAM(S): at 06:13

## 2017-11-27 RX ADMIN — Medication 50 MICROGRAM(S): at 06:14

## 2017-11-27 RX ADMIN — MORPHINE SULFATE 4 MILLIGRAM(S): 50 CAPSULE, EXTENDED RELEASE ORAL at 01:41

## 2017-11-27 RX ADMIN — MUPIROCIN 1 APPLICATION(S): 20 OINTMENT TOPICAL at 17:37

## 2017-11-27 RX ADMIN — ONDANSETRON 4 MILLIGRAM(S): 8 TABLET, FILM COATED ORAL at 11:00

## 2017-11-27 RX ADMIN — Medication 7.5 MILLIGRAM(S): at 17:37

## 2017-11-27 RX ADMIN — OXYCODONE HYDROCHLORIDE 10 MILLIGRAM(S): 5 TABLET ORAL at 06:13

## 2017-11-27 RX ADMIN — MORPHINE SULFATE 4 MILLIGRAM(S): 50 CAPSULE, EXTENDED RELEASE ORAL at 07:52

## 2017-11-27 RX ADMIN — MUPIROCIN 1 APPLICATION(S): 20 OINTMENT TOPICAL at 06:14

## 2017-11-28 ENCOUNTER — RESULT REVIEW (OUTPATIENT)
Age: 82
End: 2017-11-28

## 2017-11-28 ENCOUNTER — TRANSCRIPTION ENCOUNTER (OUTPATIENT)
Age: 82
End: 2017-11-28

## 2017-11-28 LAB
ANION GAP SERPL CALC-SCNC: 6 MMOL/L — SIGNIFICANT CHANGE UP (ref 5–17)
ANION GAP SERPL CALC-SCNC: 7 MMOL/L — SIGNIFICANT CHANGE UP (ref 5–17)
APTT BLD: 27.9 SEC — SIGNIFICANT CHANGE UP (ref 27.5–37.4)
BLD GP AB SCN SERPL QL: SIGNIFICANT CHANGE UP
BUN SERPL-MCNC: 19 MG/DL — SIGNIFICANT CHANGE UP (ref 7–23)
BUN SERPL-MCNC: 20 MG/DL — SIGNIFICANT CHANGE UP (ref 7–23)
CALCIUM SERPL-MCNC: 7.5 MG/DL — LOW (ref 8.5–10.1)
CALCIUM SERPL-MCNC: 8.3 MG/DL — LOW (ref 8.5–10.1)
CHLORIDE SERPL-SCNC: 106 MMOL/L — SIGNIFICANT CHANGE UP (ref 96–108)
CHLORIDE SERPL-SCNC: 106 MMOL/L — SIGNIFICANT CHANGE UP (ref 96–108)
CO2 SERPL-SCNC: 27 MMOL/L — SIGNIFICANT CHANGE UP (ref 22–31)
CO2 SERPL-SCNC: 28 MMOL/L — SIGNIFICANT CHANGE UP (ref 22–31)
CREAT SERPL-MCNC: 0.54 MG/DL — SIGNIFICANT CHANGE UP (ref 0.5–1.3)
CREAT SERPL-MCNC: 0.65 MG/DL — SIGNIFICANT CHANGE UP (ref 0.5–1.3)
GLUCOSE SERPL-MCNC: 79 MG/DL — SIGNIFICANT CHANGE UP (ref 70–99)
GLUCOSE SERPL-MCNC: 95 MG/DL — SIGNIFICANT CHANGE UP (ref 70–99)
HCT VFR BLD CALC: 34.1 % — LOW (ref 34.5–45)
HCT VFR BLD CALC: 36 % — SIGNIFICANT CHANGE UP (ref 34.5–45)
HGB BLD-MCNC: 11.2 G/DL — LOW (ref 11.5–15.5)
HGB BLD-MCNC: 11.6 G/DL — SIGNIFICANT CHANGE UP (ref 11.5–15.5)
INR BLD: 1.1 RATIO — SIGNIFICANT CHANGE UP (ref 0.88–1.16)
MAGNESIUM SERPL-MCNC: 2.3 MG/DL — SIGNIFICANT CHANGE UP (ref 1.6–2.6)
MCHC RBC-ENTMCNC: 32.4 GM/DL — SIGNIFICANT CHANGE UP (ref 32–36)
MCHC RBC-ENTMCNC: 32.8 GM/DL — SIGNIFICANT CHANGE UP (ref 32–36)
MCHC RBC-ENTMCNC: 33.1 PG — SIGNIFICANT CHANGE UP (ref 27–34)
MCHC RBC-ENTMCNC: 33.5 PG — SIGNIFICANT CHANGE UP (ref 27–34)
MCV RBC AUTO: 102.3 FL — HIGH (ref 80–100)
MCV RBC AUTO: 102.4 FL — HIGH (ref 80–100)
PHOSPHATE SERPL-MCNC: 2.5 MG/DL — SIGNIFICANT CHANGE UP (ref 2.5–4.5)
PLATELET # BLD AUTO: 265 K/UL — SIGNIFICANT CHANGE UP (ref 150–400)
PLATELET # BLD AUTO: 267 K/UL — SIGNIFICANT CHANGE UP (ref 150–400)
POTASSIUM SERPL-MCNC: 4 MMOL/L — SIGNIFICANT CHANGE UP (ref 3.5–5.3)
POTASSIUM SERPL-MCNC: 4.4 MMOL/L — SIGNIFICANT CHANGE UP (ref 3.5–5.3)
POTASSIUM SERPL-SCNC: 4 MMOL/L — SIGNIFICANT CHANGE UP (ref 3.5–5.3)
POTASSIUM SERPL-SCNC: 4.4 MMOL/L — SIGNIFICANT CHANGE UP (ref 3.5–5.3)
PROTHROM AB SERPL-ACNC: 12 SEC — SIGNIFICANT CHANGE UP (ref 9.8–12.7)
RBC # BLD: 3.33 M/UL — LOW (ref 3.8–5.2)
RBC # BLD: 3.52 M/UL — LOW (ref 3.8–5.2)
RBC # FLD: 11.6 % — SIGNIFICANT CHANGE UP (ref 11–15)
RBC # FLD: 12 % — SIGNIFICANT CHANGE UP (ref 11–15)
SODIUM SERPL-SCNC: 140 MMOL/L — SIGNIFICANT CHANGE UP (ref 135–145)
SODIUM SERPL-SCNC: 140 MMOL/L — SIGNIFICANT CHANGE UP (ref 135–145)
WBC # BLD: 11.4 K/UL — HIGH (ref 3.8–10.5)
WBC # BLD: 6.6 K/UL — SIGNIFICANT CHANGE UP (ref 3.8–10.5)
WBC # FLD AUTO: 11.4 K/UL — HIGH (ref 3.8–10.5)
WBC # FLD AUTO: 6.6 K/UL — SIGNIFICANT CHANGE UP (ref 3.8–10.5)

## 2017-11-28 PROCEDURE — 88311 DECALCIFY TISSUE: CPT | Mod: 26

## 2017-11-28 PROCEDURE — 88300 SURGICAL PATH GROSS: CPT | Mod: 26,59

## 2017-11-28 PROCEDURE — 72170 X-RAY EXAM OF PELVIS: CPT | Mod: 26

## 2017-11-28 PROCEDURE — 88305 TISSUE EXAM BY PATHOLOGIST: CPT | Mod: 26

## 2017-11-28 PROCEDURE — 99233 SBSQ HOSP IP/OBS HIGH 50: CPT

## 2017-11-28 RX ORDER — HYDROMORPHONE HYDROCHLORIDE 2 MG/ML
0.5 INJECTION INTRAMUSCULAR; INTRAVENOUS; SUBCUTANEOUS ONCE
Qty: 0 | Refills: 0 | Status: DISCONTINUED | OUTPATIENT
Start: 2017-11-28 | End: 2017-11-28

## 2017-11-28 RX ORDER — ACETAMINOPHEN 500 MG
1000 TABLET ORAL ONCE
Qty: 0 | Refills: 0 | Status: COMPLETED | OUTPATIENT
Start: 2017-11-28 | End: 2017-11-28

## 2017-11-28 RX ORDER — SODIUM CHLORIDE 9 MG/ML
1000 INJECTION, SOLUTION INTRAVENOUS
Qty: 0 | Refills: 0 | Status: DISCONTINUED | OUTPATIENT
Start: 2017-11-28 | End: 2017-11-30

## 2017-11-28 RX ORDER — POLYETHYLENE GLYCOL 3350 17 G/17G
17 POWDER, FOR SOLUTION ORAL DAILY
Qty: 0 | Refills: 0 | Status: DISCONTINUED | OUTPATIENT
Start: 2017-11-28 | End: 2017-11-28

## 2017-11-28 RX ORDER — KETOROLAC TROMETHAMINE 30 MG/ML
15 SYRINGE (ML) INJECTION ONCE
Qty: 0 | Refills: 0 | Status: DISCONTINUED | OUTPATIENT
Start: 2017-11-28 | End: 2017-11-28

## 2017-11-28 RX ORDER — CELECOXIB 200 MG/1
200 CAPSULE ORAL
Qty: 0 | Refills: 0 | Status: DISCONTINUED | OUTPATIENT
Start: 2017-11-30 | End: 2017-12-01

## 2017-11-28 RX ORDER — SODIUM CHLORIDE 9 MG/ML
1000 INJECTION, SOLUTION INTRAVENOUS
Qty: 0 | Refills: 0 | Status: DISCONTINUED | OUTPATIENT
Start: 2017-11-28 | End: 2017-11-28

## 2017-11-28 RX ORDER — LEVOTHYROXINE SODIUM 125 MCG
50 TABLET ORAL DAILY
Qty: 0 | Refills: 0 | Status: DISCONTINUED | OUTPATIENT
Start: 2017-11-28 | End: 2017-12-01

## 2017-11-28 RX ORDER — MUPIROCIN 20 MG/G
1 OINTMENT TOPICAL
Qty: 0 | Refills: 0 | Status: DISCONTINUED | OUTPATIENT
Start: 2017-11-28 | End: 2017-12-01

## 2017-11-28 RX ORDER — OXYCODONE HYDROCHLORIDE 5 MG/1
10 TABLET ORAL EVERY 4 HOURS
Qty: 0 | Refills: 0 | Status: DISCONTINUED | OUTPATIENT
Start: 2017-11-28 | End: 2017-11-29

## 2017-11-28 RX ORDER — OXYCODONE HYDROCHLORIDE 5 MG/1
5 TABLET ORAL EVERY 4 HOURS
Qty: 0 | Refills: 0 | Status: DISCONTINUED | OUTPATIENT
Start: 2017-11-28 | End: 2017-11-29

## 2017-11-28 RX ORDER — TRANEXAMIC ACID 100 MG/ML
1000 INJECTION, SOLUTION INTRAVENOUS ONCE
Qty: 0 | Refills: 0 | Status: COMPLETED | OUTPATIENT
Start: 2017-11-28 | End: 2017-11-28

## 2017-11-28 RX ORDER — ONDANSETRON 8 MG/1
4 TABLET, FILM COATED ORAL EVERY 6 HOURS
Qty: 0 | Refills: 0 | Status: DISCONTINUED | OUTPATIENT
Start: 2017-11-28 | End: 2017-12-01

## 2017-11-28 RX ORDER — DONEPEZIL HYDROCHLORIDE 10 MG/1
5 TABLET, FILM COATED ORAL AT BEDTIME
Qty: 0 | Refills: 0 | Status: DISCONTINUED | OUTPATIENT
Start: 2017-11-28 | End: 2017-12-01

## 2017-11-28 RX ORDER — CEFAZOLIN SODIUM 1 G
2000 VIAL (EA) INJECTION EVERY 8 HOURS
Qty: 0 | Refills: 0 | Status: COMPLETED | OUTPATIENT
Start: 2017-11-28 | End: 2017-11-29

## 2017-11-28 RX ORDER — DOCUSATE SODIUM 100 MG
100 CAPSULE ORAL THREE TIMES A DAY
Qty: 0 | Refills: 0 | Status: DISCONTINUED | OUTPATIENT
Start: 2017-11-28 | End: 2017-12-01

## 2017-11-28 RX ORDER — ENOXAPARIN SODIUM 100 MG/ML
40 INJECTION SUBCUTANEOUS EVERY 24 HOURS
Qty: 0 | Refills: 0 | Status: DISCONTINUED | OUTPATIENT
Start: 2017-11-29 | End: 2017-12-01

## 2017-11-28 RX ORDER — SENNA PLUS 8.6 MG/1
2 TABLET ORAL AT BEDTIME
Qty: 0 | Refills: 0 | Status: DISCONTINUED | OUTPATIENT
Start: 2017-11-28 | End: 2017-12-01

## 2017-11-28 RX ORDER — ACETAMINOPHEN 500 MG
650 TABLET ORAL EVERY 6 HOURS
Qty: 0 | Refills: 0 | Status: COMPLETED | OUTPATIENT
Start: 2017-11-28 | End: 2017-11-29

## 2017-11-28 RX ORDER — MORPHINE SULFATE 50 MG/1
2 CAPSULE, EXTENDED RELEASE ORAL
Qty: 0 | Refills: 0 | Status: DISCONTINUED | OUTPATIENT
Start: 2017-11-28 | End: 2017-12-01

## 2017-11-28 RX ADMIN — Medication 650 MILLIGRAM(S): at 23:41

## 2017-11-28 RX ADMIN — Medication 1000 MILLIGRAM(S): at 10:00

## 2017-11-28 RX ADMIN — Medication 50 MICROGRAM(S): at 05:26

## 2017-11-28 RX ADMIN — MUPIROCIN 1 APPLICATION(S): 20 OINTMENT TOPICAL at 05:27

## 2017-11-28 RX ADMIN — ONDANSETRON 4 MILLIGRAM(S): 8 TABLET, FILM COATED ORAL at 20:25

## 2017-11-28 RX ADMIN — TRAMADOL HYDROCHLORIDE 50 MILLIGRAM(S): 50 TABLET ORAL at 00:36

## 2017-11-28 RX ADMIN — DONEPEZIL HYDROCHLORIDE 5 MILLIGRAM(S): 10 TABLET, FILM COATED ORAL at 21:50

## 2017-11-28 RX ADMIN — SODIUM CHLORIDE 75 MILLILITER(S): 9 INJECTION, SOLUTION INTRAVENOUS at 20:04

## 2017-11-28 RX ADMIN — Medication 100 MILLIGRAM(S): at 21:50

## 2017-11-28 RX ADMIN — Medication 400 MILLIGRAM(S): at 09:20

## 2017-11-28 RX ADMIN — Medication 15 MILLIGRAM(S): at 13:16

## 2017-11-28 RX ADMIN — SODIUM CHLORIDE 75 MILLILITER(S): 9 INJECTION, SOLUTION INTRAVENOUS at 12:31

## 2017-11-28 RX ADMIN — HYDROMORPHONE HYDROCHLORIDE 0.5 MILLIGRAM(S): 2 INJECTION INTRAMUSCULAR; INTRAVENOUS; SUBCUTANEOUS at 19:36

## 2017-11-28 RX ADMIN — TRANEXAMIC ACID 220 MILLIGRAM(S): 100 INJECTION, SOLUTION INTRAVENOUS at 20:04

## 2017-11-28 RX ADMIN — SODIUM CHLORIDE 75 MILLILITER(S): 9 INJECTION, SOLUTION INTRAVENOUS at 19:37

## 2017-11-28 RX ADMIN — Medication 15 MILLIGRAM(S): at 13:01

## 2017-11-28 RX ADMIN — Medication 7.5 MILLIGRAM(S): at 05:26

## 2017-11-28 RX ADMIN — Medication 100 MILLIGRAM(S): at 23:40

## 2017-11-28 RX ADMIN — HYDROMORPHONE HYDROCHLORIDE 0.5 MILLIGRAM(S): 2 INJECTION INTRAMUSCULAR; INTRAVENOUS; SUBCUTANEOUS at 19:51

## 2017-11-28 RX ADMIN — SODIUM CHLORIDE 75 MILLILITER(S): 9 INJECTION, SOLUTION INTRAVENOUS at 00:33

## 2017-11-28 RX ADMIN — SODIUM CHLORIDE 100 MILLILITER(S): 9 INJECTION, SOLUTION INTRAVENOUS at 21:50

## 2017-11-28 RX ADMIN — TRAMADOL HYDROCHLORIDE 50 MILLIGRAM(S): 50 TABLET ORAL at 01:36

## 2017-11-28 NOTE — DISCHARGE NOTE ADULT - CARE PROVIDER_API CALL
Mike Rodriguez), Chin Alberto  [address] [place] [address]  Phone: [phone]  Fax: [phone]

## 2017-11-28 NOTE — DISCHARGE NOTE ADULT - ADDITIONAL INSTRUCTIONS
Activity as permitted by physical therapy.  Monitor left hip surgical wound. No need for dressing. There is a clear Prineo in place. This will be removed upon f/u in Dr office  Please call doctor's office for a follow up appointment.  Posterior total hip precautions.  Continue Lovenox & Celebrex for total of 4 weeks from surgery.  Monitor blood as indicated.

## 2017-11-28 NOTE — DISCHARGE NOTE ADULT - HOSPITAL COURSE
82F admitted with left hip pain due to previous fractured left hip, who was scheduled for a left total hip replacement on 11/28/17. Patient underwent a left total hip arthroplasty on 11/28 without intraop complications and was transferred to PACU and then to the floors in stable condition. Postop patient did well, tolerated advancing diet with return of bowel function, and pain adequately controlled. Patient was seen by Physical therapy, ambulated. Patient was discharged in stable condition once cleared by PT/SW/CM. 82F admitted with left hip pain due to previous fractured left hip, who was scheduled for a left total hip replacement on 11/28/17. Patient underwent a left total hip arthroplasty on 11/28 without intraop complications and was transferred to PACU and then to the floors in stable condition.     The evening of POD #1, the pt c/o SOB. W/U showed an acute drop in h/h to 7.9/23. She was transfused 2 units PRBC. She was also started on O2 , via NC. CXR was clear. Medical consultation obtained.     F/U H/H after 2 units of PRBC was 10.4/30.9.    The pt. had physical therapy. Impression was that the pt needed STR.    On POD # 3, the pt was cleared for discharge to [facility]. She was to receiv 30 days of Lovenox 40 mg BID & Celebrex 200mg OD.    To be f/u in office.

## 2017-11-28 NOTE — DISCHARGE NOTE ADULT - MEDICATION SUMMARY - MEDICATIONS TO TAKE
I will START or STAY ON the medications listed below when I get home from the hospital:    traMADol 50 mg oral tablet, disintegrating  -- Indication: For moderate pain    celecoxib 200 mg oral capsule  -- 1 cap(s) by mouth once a day (before a meal)  -- Indication: For antiinflammatory    traMADol 50 mg oral tablet  -- 1 tab(s) by mouth every 4 hours  -- Indication: For moderate pain    oxyCODONE 5 mg oral tablet  -- 1 tab(s) by mouth every 6 hours, As Needed - for severe pain  -- Indication: For severe pain    aluminum hydroxide-magnesium hydroxide 200 mg-200 mg/5 mL oral suspension  -- 30 milliliter(s) by mouth 4 times a day, As needed, Indigestion  -- Indication: For for dyspepsia    enoxaparin  -- 40 milligram(s) subcutaneous 2 times a day  -- Indication: For anticoagulant    gabapentin 100 mg oral capsule  -- 1 cap(s) by mouth 3 times a day  -- Indication: For Pain relief    busPIRone 7.5 mg oral tablet  -- 1 tab(s) by mouth 2 times a day  -- Indication: For anxiolytic    donepezil 5 mg oral tablet  -- 1 tab(s) by mouth once a day (at bedtime)  -- Indication: For sleeping aid    bisacodyl 10 mg rectal suppository  -- 1 suppository(ies) rectally once a day, As needed, If no bowel movement by POD#2  -- Indication: For stool softener    docusate sodium 100 mg oral capsule  -- 1 cap(s) by mouth 3 times a day  -- Indication: For stool softener    senna oral tablet  -- 2 tab(s) by mouth once a day (at bedtime), As needed, Constipation  -- Indication: For stool softener    Synthroid  --  by mouth   -- Indication: For thyroid replacement    Centrum oral tablet  -- 1 tab(s) by mouth once a day  -- Indication: For vitamin    Multiple Vitamins oral tablet  -- 1 tab(s) by mouth once a day  -- Indication: For vitamin I will START or STAY ON the medications listed below when I get home from the hospital:    traMADol 50 mg oral tablet, disintegrating  -- Indication: For Pain medicine    celecoxib 200 mg oral capsule  -- 1 cap(s) by mouth once a day (before a meal)  -- Indication: For antiinflammatory    traMADol 50 mg oral tablet  -- 1 tab(s) by mouth every 4 hours  -- Indication: For Pain medicine    oxyCODONE 5 mg oral tablet  -- 1 tab(s) by mouth every 6 hours, As Needed - for severe pain  -- Indication: For moderate to severe pain    aluminum hydroxide-magnesium hydroxide 200 mg-200 mg/5 mL oral suspension  -- 30 milliliter(s) by mouth 4 times a day, As needed, Indigestion  -- Indication: For antacid    enoxaparin  -- 40 milligram(s) subcutaneous 2 times a day  -- Indication: For anticoagulant    gabapentin 100 mg oral capsule  -- 1 cap(s) by mouth 3 times a day  -- Indication: For Pain medicine    busPIRone 7.5 mg oral tablet  -- 1 tab(s) by mouth 2 times a day  -- Indication: For anxiolytic    donepezil 5 mg oral tablet  -- 1 tab(s) by mouth once a day (at bedtime)  -- Indication: For Neuropathic pain    bisacodyl 10 mg rectal suppository  -- 1 suppository(ies) rectally once a day, As needed, If no bowel movement by POD#2  -- Indication: For stool softener    docusate sodium 100 mg oral capsule  -- 1 cap(s) by mouth 3 times a day  -- Indication: For stool softener    senna oral tablet  -- 2 tab(s) by mouth once a day (at bedtime), As needed, Constipation  -- Indication: For stool softener    Synthroid  --  by mouth   -- Indication: For thyroid replacement    Centrum oral tablet  -- 1 tab(s) by mouth once a day  -- Indication: For vitamin    Multiple Vitamins oral tablet  -- 1 tab(s) by mouth once a day  -- Indication: For vitamin

## 2017-11-28 NOTE — DISCHARGE NOTE ADULT - PATIENT PORTAL LINK FT
“You can access the [facility] Patient Portal, offered by [facility], by registering with the following website: [email or web address]”

## 2017-11-28 NOTE — DISCHARGE NOTE ADULT - PLAN OF CARE
cont. home medications, follow up with PMD recover from surgery Use prescribed pain medication as directed, do not drive while taking prescribed narcotics. Continue to use stool softener to prevent constipation. DVT prophylaxis with Lovenox. Posterior hip precautions. Keep wound clean and dry. Ambulate, weight bearing as tolerated. Please call the office of Dr. Rodriguez to make an apt. for follow up in 2 weeks. Call MD sooner if any issues. monitor level in rehab

## 2017-11-28 NOTE — DISCHARGE NOTE ADULT - MEDICATION SUMMARY - MEDICATIONS TO STOP TAKING
I will STOP taking the medications listed below when I get home from the hospital:    mupirocin 2% topical ointment  -- Apply on skin to affected area 2 times a day MDD:2 apply nasally bilateral  -- For external use only. I will STOP taking the medications listed below when I get home from the hospital:  None

## 2017-11-28 NOTE — DISCHARGE NOTE ADULT - CARE PLAN
Principal Discharge DX:	Pain of left hip  Goal:	recover from surgery  Instructions for follow-up, activity and diet:	Use prescribed pain medication as directed, do not drive while taking prescribed narcotics. Continue to use stool softener to prevent constipation. DVT prophylaxis with Lovenox. Posterior hip precautions. Keep wound clean and dry. Ambulate, weight bearing as tolerated. Please call the office of Dr. Rodriguez to make an apt. for follow up in 2 weeks. Call MD sooner if any issues.  Secondary Diagnosis:	Hypothyroid  Instructions for follow-up, activity and diet:	cont. home medications, follow up with PMD  Secondary Diagnosis:	Chronic obstructive pulmonary disease  Instructions for follow-up, activity and diet:	cont. home medications, follow up with PMD  Secondary Diagnosis:	Neuropathic pain  Instructions for follow-up, activity and diet:	cont. home medications, follow up with PMD Principal Discharge DX:	Pain of left hip  Goal:	recover from surgery  Instructions for follow-up, activity and diet:	Use prescribed pain medication as directed, do not drive while taking prescribed narcotics. Continue to use stool softener to prevent constipation. DVT prophylaxis with Lovenox. Posterior hip precautions. Keep wound clean and dry. Ambulate, weight bearing as tolerated. Please call the office of Dr. Rodriguez to make an apt. for follow up in 2 weeks. Call MD sooner if any issues.  Secondary Diagnosis:	Hypothyroid  Instructions for follow-up, activity and diet:	cont. home medications, follow up with PMD  Secondary Diagnosis:	Chronic obstructive pulmonary disease  Instructions for follow-up, activity and diet:	cont. home medications, follow up with PMD  Secondary Diagnosis:	Neuropathic pain  Instructions for follow-up, activity and diet:	cont. home medications, follow up with PMD  Secondary Diagnosis:	Hypophosphatemia  Instructions for follow-up, activity and diet:	monitor level in rehab

## 2017-11-29 ENCOUNTER — TRANSCRIPTION ENCOUNTER (OUTPATIENT)
Age: 82
End: 2017-11-29

## 2017-11-29 DIAGNOSIS — M25.552 PAIN IN LEFT HIP: ICD-10-CM

## 2017-11-29 LAB
ACANTHOCYTES BLD QL SMEAR: SLIGHT — SIGNIFICANT CHANGE UP
ALBUMIN SERPL ELPH-MCNC: 2.3 G/DL — LOW (ref 3.3–5)
ALP SERPL-CCNC: 41 U/L — SIGNIFICANT CHANGE UP (ref 40–120)
ALT FLD-CCNC: 27 U/L — SIGNIFICANT CHANGE UP (ref 12–78)
ANION GAP SERPL CALC-SCNC: 6 MMOL/L — SIGNIFICANT CHANGE UP (ref 5–17)
ANION GAP SERPL CALC-SCNC: 9 MMOL/L — SIGNIFICANT CHANGE UP (ref 5–17)
ANISOCYTOSIS BLD QL: SLIGHT — SIGNIFICANT CHANGE UP
AST SERPL-CCNC: 37 U/L — SIGNIFICANT CHANGE UP (ref 15–37)
BASOPHILS # BLD AUTO: 0 K/UL — SIGNIFICANT CHANGE UP (ref 0–0.2)
BASOPHILS NFR BLD AUTO: 0.2 % — SIGNIFICANT CHANGE UP (ref 0–2)
BILIRUB SERPL-MCNC: 0.3 MG/DL — SIGNIFICANT CHANGE UP (ref 0.2–1.2)
BUN SERPL-MCNC: 19 MG/DL — SIGNIFICANT CHANGE UP (ref 7–23)
BUN SERPL-MCNC: 22 MG/DL — SIGNIFICANT CHANGE UP (ref 7–23)
CALCIUM SERPL-MCNC: 7 MG/DL — LOW (ref 8.5–10.1)
CALCIUM SERPL-MCNC: 7.2 MG/DL — LOW (ref 8.5–10.1)
CHLORIDE SERPL-SCNC: 104 MMOL/L — SIGNIFICANT CHANGE UP (ref 96–108)
CHLORIDE SERPL-SCNC: 105 MMOL/L — SIGNIFICANT CHANGE UP (ref 96–108)
CK MB BLD-MCNC: 1.2 % — SIGNIFICANT CHANGE UP (ref 0–3.5)
CK MB CFR SERPL CALC: 5.3 NG/ML — HIGH (ref 0.5–3.6)
CK SERPL-CCNC: 451 U/L — HIGH (ref 26–192)
CO2 SERPL-SCNC: 25 MMOL/L — SIGNIFICANT CHANGE UP (ref 22–31)
CO2 SERPL-SCNC: 27 MMOL/L — SIGNIFICANT CHANGE UP (ref 22–31)
CREAT SERPL-MCNC: 0.67 MG/DL — SIGNIFICANT CHANGE UP (ref 0.5–1.3)
CREAT SERPL-MCNC: 0.69 MG/DL — SIGNIFICANT CHANGE UP (ref 0.5–1.3)
EOSINOPHIL # BLD AUTO: 0.1 K/UL — SIGNIFICANT CHANGE UP (ref 0–0.5)
EOSINOPHIL NFR BLD AUTO: 0.9 % — SIGNIFICANT CHANGE UP (ref 0–6)
GLUCOSE SERPL-MCNC: 117 MG/DL — HIGH (ref 70–99)
GLUCOSE SERPL-MCNC: 92 MG/DL — SIGNIFICANT CHANGE UP (ref 70–99)
HCT VFR BLD CALC: 23 % — LOW (ref 34.5–45)
HCT VFR BLD CALC: 27.3 % — LOW (ref 34.5–45)
HGB BLD-MCNC: 7.9 G/DL — LOW (ref 11.5–15.5)
HGB BLD-MCNC: 9.1 G/DL — LOW (ref 11.5–15.5)
HYPOCHROMIA BLD QL: SLIGHT — SIGNIFICANT CHANGE UP
LYMPHOCYTES # BLD AUTO: 0.7 K/UL — LOW (ref 1–3.3)
LYMPHOCYTES # BLD AUTO: 8.2 % — LOW (ref 13–44)
MAGNESIUM SERPL-MCNC: 2.1 MG/DL — SIGNIFICANT CHANGE UP (ref 1.6–2.6)
MCHC RBC-ENTMCNC: 33.1 PG — SIGNIFICANT CHANGE UP (ref 27–34)
MCHC RBC-ENTMCNC: 33.3 GM/DL — SIGNIFICANT CHANGE UP (ref 32–36)
MCHC RBC-ENTMCNC: 33.9 PG — SIGNIFICANT CHANGE UP (ref 27–34)
MCHC RBC-ENTMCNC: 34.3 GM/DL — SIGNIFICANT CHANGE UP (ref 32–36)
MCV RBC AUTO: 98.8 FL — SIGNIFICANT CHANGE UP (ref 80–100)
MCV RBC AUTO: 99.6 FL — SIGNIFICANT CHANGE UP (ref 80–100)
MICROCYTES BLD QL: SLIGHT — SIGNIFICANT CHANGE UP
MONOCYTES # BLD AUTO: 0.9 K/UL — SIGNIFICANT CHANGE UP (ref 0–0.9)
MONOCYTES NFR BLD AUTO: 10.2 % — SIGNIFICANT CHANGE UP (ref 2–14)
NEUTROPHILS # BLD AUTO: 6.9 K/UL — SIGNIFICANT CHANGE UP (ref 1.8–7.4)
NEUTROPHILS NFR BLD AUTO: 80.5 % — HIGH (ref 43–77)
OVALOCYTES BLD QL SMEAR: SLIGHT — SIGNIFICANT CHANGE UP
PHOSPHATE SERPL-MCNC: 1.8 MG/DL — LOW (ref 2.5–4.5)
PLAT MORPH BLD: NORMAL — SIGNIFICANT CHANGE UP
PLATELET # BLD AUTO: 211 K/UL — SIGNIFICANT CHANGE UP (ref 150–400)
PLATELET # BLD AUTO: 250 K/UL — SIGNIFICANT CHANGE UP (ref 150–400)
POTASSIUM SERPL-MCNC: 3.9 MMOL/L — SIGNIFICANT CHANGE UP (ref 3.5–5.3)
POTASSIUM SERPL-MCNC: 4.5 MMOL/L — SIGNIFICANT CHANGE UP (ref 3.5–5.3)
POTASSIUM SERPL-SCNC: 3.9 MMOL/L — SIGNIFICANT CHANGE UP (ref 3.5–5.3)
POTASSIUM SERPL-SCNC: 4.5 MMOL/L — SIGNIFICANT CHANGE UP (ref 3.5–5.3)
PROT SERPL-MCNC: 4.9 GM/DL — LOW (ref 6–8.3)
RBC # BLD: 2.33 M/UL — LOW (ref 3.8–5.2)
RBC # BLD: 2.74 M/UL — LOW (ref 3.8–5.2)
RBC # FLD: 11.5 % — SIGNIFICANT CHANGE UP (ref 11–15)
RBC # FLD: 11.7 % — SIGNIFICANT CHANGE UP (ref 11–15)
RBC BLD AUTO: ABNORMAL
SODIUM SERPL-SCNC: 138 MMOL/L — SIGNIFICANT CHANGE UP (ref 135–145)
SODIUM SERPL-SCNC: 138 MMOL/L — SIGNIFICANT CHANGE UP (ref 135–145)
TROPONIN I SERPL-MCNC: <.015 NG/ML — SIGNIFICANT CHANGE UP (ref 0.01–0.04)
WBC # BLD: 11.3 K/UL — HIGH (ref 3.8–10.5)
WBC # BLD: 8.6 K/UL — SIGNIFICANT CHANGE UP (ref 3.8–10.5)
WBC # FLD AUTO: 11.3 K/UL — HIGH (ref 3.8–10.5)
WBC # FLD AUTO: 8.6 K/UL — SIGNIFICANT CHANGE UP (ref 3.8–10.5)

## 2017-11-29 PROCEDURE — 72170 X-RAY EXAM OF PELVIS: CPT | Mod: 26

## 2017-11-29 PROCEDURE — 99233 SBSQ HOSP IP/OBS HIGH 50: CPT

## 2017-11-29 PROCEDURE — 71010: CPT | Mod: 26

## 2017-11-29 RX ORDER — POTASSIUM PHOSPHATE, MONOBASIC POTASSIUM PHOSPHATE, DIBASIC 236; 224 MG/ML; MG/ML
15 INJECTION, SOLUTION INTRAVENOUS ONCE
Qty: 0 | Refills: 0 | Status: COMPLETED | OUTPATIENT
Start: 2017-11-29 | End: 2017-11-29

## 2017-11-29 RX ORDER — METOCLOPRAMIDE HCL 10 MG
10 TABLET ORAL ONCE
Qty: 0 | Refills: 0 | Status: COMPLETED | OUTPATIENT
Start: 2017-11-29 | End: 2017-11-29

## 2017-11-29 RX ORDER — SODIUM,POTASSIUM PHOSPHATES 278-250MG
1 POWDER IN PACKET (EA) ORAL
Qty: 0 | Refills: 0 | Status: DISCONTINUED | OUTPATIENT
Start: 2017-11-29 | End: 2017-12-01

## 2017-11-29 RX ORDER — TRAMADOL HYDROCHLORIDE 50 MG/1
50 TABLET ORAL EVERY 6 HOURS
Qty: 0 | Refills: 0 | Status: DISCONTINUED | OUTPATIENT
Start: 2017-11-29 | End: 2017-12-01

## 2017-11-29 RX ORDER — TRAMADOL HYDROCHLORIDE 50 MG/1
50 TABLET ORAL ONCE
Qty: 0 | Refills: 0 | Status: DISCONTINUED | OUTPATIENT
Start: 2017-11-29 | End: 2017-11-29

## 2017-11-29 RX ORDER — FUROSEMIDE 40 MG
10 TABLET ORAL ONCE
Qty: 0 | Refills: 0 | Status: DISCONTINUED | OUTPATIENT
Start: 2017-11-29 | End: 2017-11-30

## 2017-11-29 RX ORDER — GABAPENTIN 400 MG/1
100 CAPSULE ORAL THREE TIMES A DAY
Qty: 0 | Refills: 0 | Status: DISCONTINUED | OUTPATIENT
Start: 2017-11-29 | End: 2017-12-01

## 2017-11-29 RX ORDER — ACETAMINOPHEN 500 MG
1000 TABLET ORAL ONCE
Qty: 0 | Refills: 0 | Status: COMPLETED | OUTPATIENT
Start: 2017-11-29 | End: 2017-11-29

## 2017-11-29 RX ORDER — SODIUM CHLORIDE 9 MG/ML
500 INJECTION INTRAMUSCULAR; INTRAVENOUS; SUBCUTANEOUS ONCE
Qty: 0 | Refills: 0 | Status: COMPLETED | OUTPATIENT
Start: 2017-11-29 | End: 2017-11-29

## 2017-11-29 RX ORDER — KETOROLAC TROMETHAMINE 30 MG/ML
30 SYRINGE (ML) INJECTION ONCE
Qty: 0 | Refills: 0 | Status: DISCONTINUED | OUTPATIENT
Start: 2017-11-29 | End: 2017-11-29

## 2017-11-29 RX ADMIN — TRAMADOL HYDROCHLORIDE 50 MILLIGRAM(S): 50 TABLET ORAL at 05:17

## 2017-11-29 RX ADMIN — POTASSIUM PHOSPHATE, MONOBASIC POTASSIUM PHOSPHATE, DIBASIC 63.75 MILLIMOLE(S): 236; 224 INJECTION, SOLUTION INTRAVENOUS at 22:00

## 2017-11-29 RX ADMIN — DONEPEZIL HYDROCHLORIDE 5 MILLIGRAM(S): 10 TABLET, FILM COATED ORAL at 22:00

## 2017-11-29 RX ADMIN — TRAMADOL HYDROCHLORIDE 50 MILLIGRAM(S): 50 TABLET ORAL at 21:34

## 2017-11-29 RX ADMIN — Medication 1 TABLET(S): at 12:06

## 2017-11-29 RX ADMIN — TRAMADOL HYDROCHLORIDE 50 MILLIGRAM(S): 50 TABLET ORAL at 06:15

## 2017-11-29 RX ADMIN — Medication 7.5 MILLIGRAM(S): at 05:21

## 2017-11-29 RX ADMIN — ENOXAPARIN SODIUM 40 MILLIGRAM(S): 100 INJECTION SUBCUTANEOUS at 18:06

## 2017-11-29 RX ADMIN — MUPIROCIN 1 APPLICATION(S): 20 OINTMENT TOPICAL at 05:22

## 2017-11-29 RX ADMIN — TRAMADOL HYDROCHLORIDE 50 MILLIGRAM(S): 50 TABLET ORAL at 12:06

## 2017-11-29 RX ADMIN — Medication 10 MILLIGRAM(S): at 22:10

## 2017-11-29 RX ADMIN — Medication 1000 MILLIGRAM(S): at 04:00

## 2017-11-29 RX ADMIN — GABAPENTIN 100 MILLIGRAM(S): 400 CAPSULE ORAL at 21:59

## 2017-11-29 RX ADMIN — MUPIROCIN 1 APPLICATION(S): 20 OINTMENT TOPICAL at 18:07

## 2017-11-29 RX ADMIN — SODIUM CHLORIDE 100 MILLILITER(S): 9 INJECTION, SOLUTION INTRAVENOUS at 08:44

## 2017-11-29 RX ADMIN — Medication 1000 MILLIGRAM(S): at 09:00

## 2017-11-29 RX ADMIN — Medication 100 MILLIGRAM(S): at 15:12

## 2017-11-29 RX ADMIN — Medication 30 MILLILITER(S): at 15:29

## 2017-11-29 RX ADMIN — TRAMADOL HYDROCHLORIDE 50 MILLIGRAM(S): 50 TABLET ORAL at 20:34

## 2017-11-29 RX ADMIN — Medication 1 TABLET(S): at 23:36

## 2017-11-29 RX ADMIN — Medication 100 MILLIGRAM(S): at 21:59

## 2017-11-29 RX ADMIN — Medication 30 MILLIGRAM(S): at 18:17

## 2017-11-29 RX ADMIN — Medication 100 MILLIGRAM(S): at 07:59

## 2017-11-29 RX ADMIN — Medication 50 MICROGRAM(S): at 05:20

## 2017-11-29 RX ADMIN — Medication 30 MILLIGRAM(S): at 18:37

## 2017-11-29 RX ADMIN — TRAMADOL HYDROCHLORIDE 50 MILLIGRAM(S): 50 TABLET ORAL at 13:00

## 2017-11-29 RX ADMIN — Medication 400 MILLIGRAM(S): at 03:26

## 2017-11-29 RX ADMIN — Medication 400 MILLIGRAM(S): at 08:44

## 2017-11-29 RX ADMIN — SODIUM CHLORIDE 1000 MILLILITER(S): 9 INJECTION INTRAMUSCULAR; INTRAVENOUS; SUBCUTANEOUS at 19:18

## 2017-11-29 RX ADMIN — ONDANSETRON 4 MILLIGRAM(S): 8 TABLET, FILM COATED ORAL at 06:35

## 2017-11-29 RX ADMIN — ONDANSETRON 4 MILLIGRAM(S): 8 TABLET, FILM COATED ORAL at 15:12

## 2017-11-29 NOTE — PHYSICAL THERAPY INITIAL EVALUATION ADULT - IMPAIRED TRANSFERS: BED/CHAIR, REHAB EVAL
decreased ROM/narrow base of support/pain/impaired postural control/decreased flexibility/decreased strength/impaired balance

## 2017-11-29 NOTE — PHYSICAL THERAPY INITIAL EVALUATION ADULT - IMPAIRMENTS CONTRIBUTING TO GAIT DEVIATIONS, PT EVAL
decreased strength/decreased flexibility/decreased ROM/narrow base of support/pain/impaired balance/impaired postural control

## 2017-11-29 NOTE — PHYSICAL THERAPY INITIAL EVALUATION ADULT - MANUAL MUSCLE TESTING RESULTS, REHAB EVAL
grossly assessed due to/surgery and pain. grossly 3+/5 throughout  except L hip 2/5 and L knee and ankle 3/5

## 2017-11-29 NOTE — CHART NOTE - NSCHARTNOTEFT_GEN_A_CORE
Called by RN to evaluate patient complaining of chest pressure and tightness and some shortness of breath. Patient also complained of dizziness throughout the day and had narcotic pain medications held. Complaining of nerve pain and pins and needles throughout her LLE.     Vital Signs Last 24 Hrs  T(F): 98 (17 @ 19:45), Max: 98.7 (17 @ 18:53)  HR: 78 (17 @ 19:45)  BP: 119/71 (17 @ 19:45)  RR: 16 (17 @ 19:45)  SpO2: 98% (17 @ 19:45)    GENERAL: Alert, NAD  CHEST/LUNG: Clear to auscultation bilaterally, respirations nonlabored  HEART: S1S2, Regular rate and rhythm;   ABDOMEN: + Bowel sounds, soft, Nontender, Nondistended  EXTREMITIES:  no calf tenderness, No edema. LLE wound clean dry and intact with PRINEO dressing.    I&O's Detail    2017 07:01  -  2017 07:00  --------------------------------------------------------  IN:    lactated ringers.: 75 mL    sodium chloride 0.45%: 600 mL    Solution: 50 mL    Solution: 200 mL  Total IN: 925 mL    OUT:    Voided: 500 mL  Total OUT: 500 mL    Total NET: 425 mL    LABS:                        7.9    8.6   )-----------( 211      ( 2017 19:54 )             23.0         138  |  105  |  19  ----------------------------<  92  3.9   |  27  |  0.69    Ca    7.0<L>      2017 19:54  Phos  1.8       Mg     2.1         TPro  4.9<L>  /  Alb  2.3<L>  /  TBili  0.3  /  DBili  x   /  AST  37  /  ALT  27  /  AlkPhos  41      PT/INR - ( 2017 06:27 )   PT: 12.0 sec;   INR: 1.10 ratio      PTT - ( 2017 06:27 )  PTT:27.9 sec    Troponin I, Serum: <.015    RADIOLOGY & ADDITIONAL STUDIES:  from: Xray Chest 1 View AP/PA. (17 @ 19:37): Clear  lungs.  No acute airspace disease suggested.    EKbpm, NSR    82 year old female s/p L JONA POD#1 with acute blood loss anemia. Hypophosphatemia    - continue local wound care  - 2 U PRBC to be transfused, Please follow up post transfusion CBC, lasix between transfusions.   - AM labs  - replace phos  - remote telemetry   - O2 nasal cannula  - DVT prophylaxis, Incentive Spirometer, OOB, Ambulating, pain control  - called medicine to evaluate patient, awaiting input.   - Discussed with Dr. Rodriguez

## 2017-11-29 NOTE — PHYSICAL THERAPY INITIAL EVALUATION ADULT - PLANNED THERAPY INTERVENTIONS, PT EVAL
strengthening/ROM/transfer training/bed mobility training/gait training/postural re-education/balance training

## 2017-11-29 NOTE — PHYSICAL THERAPY INITIAL EVALUATION ADULT - MODIFIED CLINICAL TEST OF SENSORY INTEGRATION IN BALANCE TEST
Barthel Index: Feeding Score 10_/10__, Bathing Score _0/5__, Grooming Score _5/5__, Dressing Score 5_/10__, Bowels Score _10_/10_, Bladder Score _5/10__, Toilet Score 5_/10__, Transfers Score _5/15__, Mobility Score _0_/15_, Stairs Score _0/10__,     Total Score _45__/100

## 2017-11-29 NOTE — CHART NOTE - NSCHARTNOTEFT_GEN_A_CORE
AM lab review:                          9.1    11.3  )-----------( 250      ( 29 Nov 2017 06:26 )             27.3       11-29    138  |  104  |  22  ----------------------------<  117<H>  4.5   |  25  |  0.67    Ca    7.2<L>      29 Nov 2017 06:26  Phos  2.5     11-28  Mg     2.3     11-28        PT/INR - ( 28 Nov 2017 06:27 )   PT: 12.0 sec;   INR: 1.10 ratio         PTT - ( 28 Nov 2017 06:27 )  PTT:27.9 sec    Impression:  reviewed with Dr. Rodriguez    Plan:  monitor labs in AM  physical therapy evaluation for disposition  probable discharge in AM

## 2017-11-29 NOTE — PHYSICAL THERAPY INITIAL EVALUATION ADULT - IMPAIRMENTS FOUND, PT EVAL
gait, locomotion, and balance/ROM/ergonomics and body mechanics/aerobic capacity/endurance/muscle strength/posture

## 2017-11-29 NOTE — PHYSICAL THERAPY INITIAL EVALUATION ADULT - CRITERIA FOR SKILLED THERAPEUTIC INTERVENTIONS
impairments found/functional limitations in following categories/risk reduction/prevention/anticipated discharge recommendation/rehab potential/predicted duration of therapy intervention/therapy frequency

## 2017-11-29 NOTE — PHYSICAL THERAPY INITIAL EVALUATION ADULT - GAIT DEVIATIONS NOTED, PT EVAL
decreased dee/decreased stride length/decreased step length/decreased weight-shifting ability/increased time in double stance/decreased velocity of limb motion

## 2017-11-29 NOTE — CHART NOTE - NSCHARTNOTEFT_GEN_A_CORE
Obtained the blood transfusion consent. Explained to patient regarding the need for blood transfusion. The risk and benefits were discussed, the risk including fever, chills, lower back pain, allergic reaction, and even death were explained. Verbalizes the understanding and consent was obtained. RN signed consent.

## 2017-11-29 NOTE — PHYSICAL THERAPY INITIAL EVALUATION ADULT - IMPAIRED TRANSFERS: SIT/STAND, REHAB EVAL
impaired balance/decreased flexibility/decreased strength/impaired postural control/decreased ROM/pain

## 2017-11-29 NOTE — PHYSICAL THERAPY INITIAL EVALUATION ADULT - TRANSFER SAFETY CONCERNS NOTED: BED/CHAIR, REHAB EVAL
decreased sequencing ability/decreased balance during turns/decreased step length/decreased weight-shifting ability

## 2017-11-30 LAB
ANION GAP SERPL CALC-SCNC: 8 MMOL/L — SIGNIFICANT CHANGE UP (ref 5–17)
BUN SERPL-MCNC: 16 MG/DL — SIGNIFICANT CHANGE UP (ref 7–23)
CALCIUM SERPL-MCNC: 7.1 MG/DL — LOW (ref 8.5–10.1)
CHLORIDE SERPL-SCNC: 108 MMOL/L — SIGNIFICANT CHANGE UP (ref 96–108)
CK MB BLD-MCNC: 0.8 % — SIGNIFICANT CHANGE UP (ref 0–3.5)
CK MB CFR SERPL CALC: 3.7 NG/ML — HIGH (ref 0.5–3.6)
CK SERPL-CCNC: 454 U/L — HIGH (ref 26–192)
CO2 SERPL-SCNC: 25 MMOL/L — SIGNIFICANT CHANGE UP (ref 22–31)
CREAT SERPL-MCNC: 0.6 MG/DL — SIGNIFICANT CHANGE UP (ref 0.5–1.3)
GLUCOSE SERPL-MCNC: 123 MG/DL — HIGH (ref 70–99)
HCT VFR BLD CALC: 30.9 % — LOW (ref 34.5–45)
HGB BLD-MCNC: 10.4 G/DL — LOW (ref 11.5–15.5)
MAGNESIUM SERPL-MCNC: 2.2 MG/DL — SIGNIFICANT CHANGE UP (ref 1.6–2.6)
MCHC RBC-ENTMCNC: 33.2 PG — SIGNIFICANT CHANGE UP (ref 27–34)
MCHC RBC-ENTMCNC: 33.6 GM/DL — SIGNIFICANT CHANGE UP (ref 32–36)
MCV RBC AUTO: 98.6 FL — SIGNIFICANT CHANGE UP (ref 80–100)
PHOSPHATE SERPL-MCNC: 2.1 MG/DL — LOW (ref 2.5–4.5)
PLATELET # BLD AUTO: 207 K/UL — SIGNIFICANT CHANGE UP (ref 150–400)
POTASSIUM SERPL-MCNC: 3.8 MMOL/L — SIGNIFICANT CHANGE UP (ref 3.5–5.3)
POTASSIUM SERPL-SCNC: 3.8 MMOL/L — SIGNIFICANT CHANGE UP (ref 3.5–5.3)
RBC # BLD: 3.13 M/UL — LOW (ref 3.8–5.2)
RBC # FLD: 12.6 % — SIGNIFICANT CHANGE UP (ref 11–15)
SODIUM SERPL-SCNC: 141 MMOL/L — SIGNIFICANT CHANGE UP (ref 135–145)
TROPONIN I SERPL-MCNC: <.015 NG/ML — SIGNIFICANT CHANGE UP (ref 0.01–0.04)
WBC # BLD: 8.6 K/UL — SIGNIFICANT CHANGE UP (ref 3.8–10.5)
WBC # FLD AUTO: 8.6 K/UL — SIGNIFICANT CHANGE UP (ref 3.8–10.5)

## 2017-11-30 PROCEDURE — 99233 SBSQ HOSP IP/OBS HIGH 50: CPT

## 2017-11-30 RX ORDER — SODIUM,POTASSIUM PHOSPHATES 278-250MG
1 POWDER IN PACKET (EA) ORAL
Qty: 0 | Refills: 0 | Status: DISCONTINUED | OUTPATIENT
Start: 2017-11-30 | End: 2017-11-30

## 2017-11-30 RX ORDER — ACETAMINOPHEN 500 MG
1000 TABLET ORAL ONCE
Qty: 0 | Refills: 0 | Status: COMPLETED | OUTPATIENT
Start: 2017-11-30 | End: 2017-11-30

## 2017-11-30 RX ADMIN — GABAPENTIN 100 MILLIGRAM(S): 400 CAPSULE ORAL at 05:13

## 2017-11-30 RX ADMIN — Medication 7.5 MILLIGRAM(S): at 17:39

## 2017-11-30 RX ADMIN — TRAMADOL HYDROCHLORIDE 50 MILLIGRAM(S): 50 TABLET ORAL at 20:00

## 2017-11-30 RX ADMIN — Medication 1 TABLET(S): at 11:55

## 2017-11-30 RX ADMIN — GABAPENTIN 100 MILLIGRAM(S): 400 CAPSULE ORAL at 21:45

## 2017-11-30 RX ADMIN — Medication 1 TABLET(S): at 21:45

## 2017-11-30 RX ADMIN — Medication 7.5 MILLIGRAM(S): at 05:13

## 2017-11-30 RX ADMIN — Medication 50 MICROGRAM(S): at 05:14

## 2017-11-30 RX ADMIN — ENOXAPARIN SODIUM 40 MILLIGRAM(S): 100 INJECTION SUBCUTANEOUS at 17:39

## 2017-11-30 RX ADMIN — Medication 1 TABLET(S): at 17:39

## 2017-11-30 RX ADMIN — TRAMADOL HYDROCHLORIDE 50 MILLIGRAM(S): 50 TABLET ORAL at 06:14

## 2017-11-30 RX ADMIN — Medication 400 MILLIGRAM(S): at 21:54

## 2017-11-30 RX ADMIN — Medication 1 TABLET(S): at 08:16

## 2017-11-30 RX ADMIN — CELECOXIB 200 MILLIGRAM(S): 200 CAPSULE ORAL at 08:16

## 2017-11-30 RX ADMIN — TRAMADOL HYDROCHLORIDE 50 MILLIGRAM(S): 50 TABLET ORAL at 05:14

## 2017-11-30 RX ADMIN — TRAMADOL HYDROCHLORIDE 50 MILLIGRAM(S): 50 TABLET ORAL at 19:06

## 2017-11-30 RX ADMIN — DONEPEZIL HYDROCHLORIDE 5 MILLIGRAM(S): 10 TABLET, FILM COATED ORAL at 21:45

## 2017-11-30 RX ADMIN — Medication 100 MILLIGRAM(S): at 14:49

## 2017-11-30 RX ADMIN — GABAPENTIN 100 MILLIGRAM(S): 400 CAPSULE ORAL at 14:49

## 2017-11-30 RX ADMIN — MUPIROCIN 1 APPLICATION(S): 20 OINTMENT TOPICAL at 17:39

## 2017-11-30 RX ADMIN — CELECOXIB 200 MILLIGRAM(S): 200 CAPSULE ORAL at 09:23

## 2017-11-30 RX ADMIN — Medication 100 MILLIGRAM(S): at 05:13

## 2017-11-30 RX ADMIN — Medication 100 MILLIGRAM(S): at 21:45

## 2017-11-30 RX ADMIN — Medication 1000 MILLIGRAM(S): at 22:24

## 2017-11-30 RX ADMIN — MUPIROCIN 1 APPLICATION(S): 20 OINTMENT TOPICAL at 05:14

## 2017-11-30 NOTE — OCCUPATIONAL THERAPY INITIAL EVALUATION ADULT - PRECAUTIONS/LIMITATIONS, REHAB EVAL
fall precautions/left hip precautions/Pt has diminished reaction time due to age related changes  and has developed fears of falling .

## 2017-11-30 NOTE — OCCUPATIONAL THERAPY INITIAL EVALUATION ADULT - LIVES WITH, PROFILE
, and has a HHA  to assist with ADL. Pt  has 5 steps to enter with bilateral  hand rails . Pt's  bathroom has tub/shower combination , commode and shower bench./alone

## 2017-11-30 NOTE — OCCUPATIONAL THERAPY INITIAL EVALUATION ADULT - PERTINENT HX OF CURRENT PROBLEM, REHAB EVAL
Pt presented to ER due to s/p fall with left hip pain. Pt is diagnosed with Prosthetic implant  failure . Pt underwent  s/p  removal and replacement of left  total hip prosthesis . X-ray 11/25/17 results confirm no acute fracture

## 2017-11-30 NOTE — PROGRESS NOTE ADULT - ASSESSMENT
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 Patient was scheduled to have her a JONA on this Tuesday 11/28 and was seen at PST on 11/24. Patient states that last night she slipped from a chair and since then her pain has been much worse and she is unable to ambulate. Pain is 9/10, feels like a shooting pain to her knee. Patient usually ambulates with a walker.       overnight events noted chest pain and pressure may have been related to demand given drop in H/H stable clear for discharge to rehab tomorrow
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 Patient was scheduled to have her a JONA on this Tuesday 11/28 and was seen at PST on 11/24. Patient states that last night she slipped from a chair and since then her pain has been much worse and she is unable to ambulate. Pain is 9/10, feels like a shooting pain to her knee. Patient usually ambulates with a walker.       Pain control an issue has had relief with Toradol consider trial of alternative opoid  or trial of NSAIDs   feeling better today seen by rehab for discharge to [facility] tomorrow
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 Patient was scheduled to have her a JONA on this Tuesday 11/28 and was seen at PST on 11/24. Patient states that last night she slipped from a chair and since then her pain has been much worse and she is unable to ambulate. Pain is 9/10, feels like a shooting pain to her knee. Patient usually ambulates with a walker.       Patient with no hx of heart disease, stable copd. Denies any symptoms except pain in hip. EKG reviewed NSR @66  She is medically optimized for L JONA on tuesday  C/w synthroid and pain control  History of dementia, c/w home meds. At higher risk for post op delirium, monitor closely, reorient avoid, sedating medications     Estimated Risk Probability for Perioperative Myocardial Infarction or Cardiac Arrest based on gabriel score is   0.42 %      No complaints today, pain is controlled, poor night sleep due to neighbor. OR scheduled for today       Clear for Surgery     Pain control an issue has had relief with Toradol consider trial of alternative opoid  or trial of NSAIDs post op
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 Patient was scheduled to have her a JONA on this Tuesday 11/28 and was seen at PST on 11/24. Patient states that last night she slipped from a chair and since then her pain has been much worse and she is unable to ambulate. Pain is 9/10, feels like a shooting pain to her knee. Patient usually ambulates with a walker.       Patient with no hx of heart disease, stable copd. Denies any symptoms except pain in hip. EKG reviewed NSR @66  She is medically optimized for L JONA on tuesday  C/w synthroid and pain control  History of dementia, c/w home meds. At higher risk for post op delirium, monitor closely, reorient avoid, sedating medications     Estimated Risk Probability for Perioperative Myocardial Infarction or Cardiac Arrest based on gabriel score is   0.42 %      No complaints today, pain is controlled, poor night sleep due to neighbor. OR scheduled for tomorrow, will follow.

## 2017-11-30 NOTE — OCCUPATIONAL THERAPY INITIAL EVALUATION ADULT - GENERAL OBSERVATIONS, REHAB EVAL
Pt was for initial OT consult encountered supine in bed, AA&Ox4, cooperative & followed commands. Pt complained of pain in left hip  due to s/p left posterior THR. This limits pt's endurance, balance ,  ADL management and functional mobility. THP were reviewed & maintained.

## 2017-11-30 NOTE — OCCUPATIONAL THERAPY INITIAL EVALUATION ADULT - IMPAIRED TRANSFERS: BED/CHAIR, REHAB EVAL
narrow base of support/pain/scissoring/decreased sensation/impaired sensory feedback/decreased ROM/impaired coordination/decreased flexibility/decreased strength

## 2017-11-30 NOTE — CHART NOTE - NSCHARTNOTEFT_GEN_A_CORE
AM lab review:                          10.4   8.6   )-----------( 207      ( 30 Nov 2017 10:35 )             30.9       11-30    141  |  108  |  16  ----------------------------<  123<H>  3.8   |  25  |  0.60    Ca    7.1<L>      30 Nov 2017 10:35  Phos  2.1     11-30  Mg     2.2     11-30    TPro  4.9<L>  /  Alb  2.3<L>  /  TBili  0.3  /  DBili  x   /  AST  37  /  ALT  27  /  AlkPhos  41  11-29               Troponin I, Serum (11.30.17 @ 10:35)    Troponin I, Serum: <.015: The new reference range for Troponin-I performed on the Siemens Vista  system is 0.015-0.045 ng/mL, which includes the 99th percentile of a  healthy reference population. Studies have shown that elevated troponin  levels above the 99th percentile cutoff are associated with an increased  risk for adverse cardiac events, with the risk increasing as troponin  levels increase. As per a joint committee of the American College of  Cardiology and European Society of Cardiology, diagnosis of classic MI is  based upon the detection of a rise or fall of cardiac troponin values,  with at least one value above the 99th percentile upper reference limit,  in the appropriate clinical context.  Troponin-I (ng/mL) Interpretation  0.00-0.045 Normal range (includes the 99th percentile of a healthy  reference population)  >0.045 Elevated troponin level indicating increased risk  Note: Troponin-I and Troponin-T cannot be used interchangeably in serial  measurements. Minimally elevated Troponin results should be interpreted  in the context of clinical findings and risk factors. ng/mL    CKMB Mass Assay (11.30.17 @ 10:35)    CKMB Units: 3.7 ng/mL    CPK Mass Assay %: 0.8 %  Creatine Kinase, Serum (11.30.17 @ 10:35)    Creatine Kinase, Serum: 454 U/L    Impression:  improved h/h  hypophosphatemia  cpk, troponins improved  - pt states she feels more alert and stronger today.  - not ready for transfer to [facility] yet - probably tomorrow.    Plan:  -continue physical therapy  -continue medical f/u  -replace phos

## 2017-11-30 NOTE — OCCUPATIONAL THERAPY INITIAL EVALUATION ADULT - RANGE OF MOTION EXAMINATION, LOWER EXTREMITY
Right LE Active ROM was WFL   (within functional limits)/AAROM  in  left hip  is 0-45  degrees/Right LE Passive ROM was WFL  (within functional limits)

## 2017-11-30 NOTE — OCCUPATIONAL THERAPY INITIAL EVALUATION ADULT - PERSONAL SAFETY AND JUDGMENT, REHAB EVAL
impaired/at risk behaviors demonstrated/Pt  needs  verbal cues for proper hand  / foot placement and to safely adhere .

## 2017-11-30 NOTE — OCCUPATIONAL THERAPY INITIAL EVALUATION ADULT - TRANSFER SAFETY CONCERNS NOTED: BED/CHAIR, REHAB EVAL
decreased balance during turns/decreased weight-shifting ability/decreased proprioception/decreased step length/decreased safety awareness/losing balance/decreased sequencing ability/stand pivot

## 2017-11-30 NOTE — OCCUPATIONAL THERAPY INITIAL EVALUATION ADULT - ADDITIONAL COMMENTS
Prior to admission, pt was functioning in her roles, very active  & ambulating independently with a rolling walker.  Presently ,  pt needs more assistance with functional  mobility and to complete self care tasks. The scale below depicts a picture of the pt's current level of functioning. Barthel Index: Feeding Score___10___, Bathing Score_____0_, Grooming Score____0_, Dressing Score___0__, Bowel Score__0___, Bladder Score____0__, Toilet Score___0__, Transfer Score___0___, Mobility Score____0_, Stairs Score___0__, Total Score___10/100__.

## 2017-11-30 NOTE — OCCUPATIONAL THERAPY INITIAL EVALUATION ADULT - PLANNED THERAPY INTERVENTIONS, OT EVAL
energy conservation techniques/ROM/ADL retraining/IADL retraining/bed mobility training/strengthening/stretching/fine motor coordination training/neuromuscular re-education/parent/caregiver training.../transfer training/balance training/joint mobilization/motor coordination training

## 2017-12-01 VITALS — TEMPERATURE: 99 F

## 2017-12-01 LAB
ANION GAP SERPL CALC-SCNC: 8 MMOL/L — SIGNIFICANT CHANGE UP (ref 5–17)
BUN SERPL-MCNC: 14 MG/DL — SIGNIFICANT CHANGE UP (ref 7–23)
CALCIUM SERPL-MCNC: 7.4 MG/DL — LOW (ref 8.5–10.1)
CHLORIDE SERPL-SCNC: 108 MMOL/L — SIGNIFICANT CHANGE UP (ref 96–108)
CO2 SERPL-SCNC: 25 MMOL/L — SIGNIFICANT CHANGE UP (ref 22–31)
CREAT SERPL-MCNC: 0.58 MG/DL — SIGNIFICANT CHANGE UP (ref 0.5–1.3)
GLUCOSE SERPL-MCNC: 70 MG/DL — SIGNIFICANT CHANGE UP (ref 70–99)
HCT VFR BLD CALC: 30.9 % — LOW (ref 34.5–45)
HGB BLD-MCNC: 10.2 G/DL — LOW (ref 11.5–15.5)
MCHC RBC-ENTMCNC: 32.2 PG — SIGNIFICANT CHANGE UP (ref 27–34)
MCHC RBC-ENTMCNC: 33.1 GM/DL — SIGNIFICANT CHANGE UP (ref 32–36)
MCV RBC AUTO: 97.3 FL — SIGNIFICANT CHANGE UP (ref 80–100)
PHOSPHATE SERPL-MCNC: 1.9 MG/DL — LOW (ref 2.5–4.5)
PLATELET # BLD AUTO: 222 K/UL — SIGNIFICANT CHANGE UP (ref 150–400)
POTASSIUM SERPL-MCNC: 4 MMOL/L — SIGNIFICANT CHANGE UP (ref 3.5–5.3)
POTASSIUM SERPL-SCNC: 4 MMOL/L — SIGNIFICANT CHANGE UP (ref 3.5–5.3)
RBC # BLD: 3.17 M/UL — LOW (ref 3.8–5.2)
RBC # FLD: 13.1 % — SIGNIFICANT CHANGE UP (ref 11–15)
SODIUM SERPL-SCNC: 141 MMOL/L — SIGNIFICANT CHANGE UP (ref 135–145)
WBC # BLD: 7.4 K/UL — SIGNIFICANT CHANGE UP (ref 3.8–10.5)
WBC # FLD AUTO: 7.4 K/UL — SIGNIFICANT CHANGE UP (ref 3.8–10.5)

## 2017-12-01 RX ORDER — ENOXAPARIN SODIUM 100 MG/ML
40 INJECTION SUBCUTANEOUS
Qty: 0 | Refills: 0 | COMMUNITY
Start: 2017-12-01 | End: 2017-12-28

## 2017-12-01 RX ORDER — OXYCODONE HYDROCHLORIDE 5 MG/1
1 TABLET ORAL
Qty: 0 | Refills: 0 | COMMUNITY

## 2017-12-01 RX ORDER — GABAPENTIN 400 MG/1
1 CAPSULE ORAL
Qty: 0 | Refills: 0 | COMMUNITY
Start: 2017-12-01

## 2017-12-01 RX ORDER — CELECOXIB 200 MG/1
1 CAPSULE ORAL
Qty: 0 | Refills: 0 | COMMUNITY
Start: 2017-12-01 | End: 2017-12-28

## 2017-12-01 RX ORDER — DOCUSATE SODIUM 100 MG
1 CAPSULE ORAL
Qty: 0 | Refills: 0 | COMMUNITY
Start: 2017-12-01

## 2017-12-01 RX ORDER — ACETAMINOPHEN 500 MG
2 TABLET ORAL
Qty: 0 | Refills: 0 | COMMUNITY

## 2017-12-01 RX ORDER — SENNA PLUS 8.6 MG/1
2 TABLET ORAL
Qty: 0 | Refills: 0 | COMMUNITY
Start: 2017-12-01

## 2017-12-01 RX ORDER — DONEPEZIL HYDROCHLORIDE 10 MG/1
1 TABLET, FILM COATED ORAL
Qty: 0 | Refills: 0 | COMMUNITY
Start: 2017-12-01

## 2017-12-01 RX ADMIN — CELECOXIB 200 MILLIGRAM(S): 200 CAPSULE ORAL at 14:02

## 2017-12-01 RX ADMIN — Medication 100 MILLIGRAM(S): at 13:06

## 2017-12-01 RX ADMIN — Medication 50 MICROGRAM(S): at 06:15

## 2017-12-01 RX ADMIN — Medication 1 TABLET(S): at 12:34

## 2017-12-01 RX ADMIN — MUPIROCIN 1 APPLICATION(S): 20 OINTMENT TOPICAL at 06:15

## 2017-12-01 RX ADMIN — GABAPENTIN 100 MILLIGRAM(S): 400 CAPSULE ORAL at 06:15

## 2017-12-01 RX ADMIN — Medication 1 TABLET(S): at 12:38

## 2017-12-01 RX ADMIN — TRAMADOL HYDROCHLORIDE 50 MILLIGRAM(S): 50 TABLET ORAL at 12:38

## 2017-12-01 RX ADMIN — TRAMADOL HYDROCHLORIDE 50 MILLIGRAM(S): 50 TABLET ORAL at 13:32

## 2017-12-01 RX ADMIN — CELECOXIB 200 MILLIGRAM(S): 200 CAPSULE ORAL at 13:06

## 2017-12-01 RX ADMIN — Medication 100 MILLIGRAM(S): at 06:15

## 2017-12-01 RX ADMIN — Medication 1 TABLET(S): at 09:28

## 2017-12-01 RX ADMIN — Medication 7.5 MILLIGRAM(S): at 06:16

## 2017-12-01 NOTE — PROGRESS NOTE ADULT - SUBJECTIVE AND OBJECTIVE BOX
Patient is a 82y old  Female who presents with a chief complaint of left hip pain (28 Nov 2017 23:05)      INTERVAL HPI/OVERNIGHT EVENTS: chest pain last night resolved     MEDICATIONS  (STANDING):  busPIRone 7.5 milliGRAM(s) Oral two times a day  celecoxib 200 milliGRAM(s) Oral with breakfast  docusate sodium 100 milliGRAM(s) Oral three times a day  donepezil 5 milliGRAM(s) Oral at bedtime  enoxaparin Injectable 40 milliGRAM(s) SubCutaneous every 24 hours  gabapentin 100 milliGRAM(s) Oral three times a day  lactated ringers. 1000 milliLiter(s) (100 mL/Hr) IV Continuous <Continuous>  levothyroxine 50 MICROGram(s) Oral daily  multivitamin 1 Tablet(s) Oral daily  mupirocin 2% Ointment 1 Application(s) Topical two times a day  potassium acid phosphate/sodium acid phosphate tablet (K-PHOS No. 2) 1 Tablet(s) Oral four times a day with meals  potassium acid phosphate/sodium acid phosphate tablet (K-PHOS No. 2) 1 Tablet(s) Oral four times a day with meals    MEDICATIONS  (PRN):  aluminum hydroxide/magnesium hydroxide/simethicone Suspension 30 milliLiter(s) Oral four times a day PRN Indigestion  bisacodyl Suppository 10 milliGRAM(s) Rectal daily PRN If no bowel movement by POD#2  morphine  - Injectable 2 milliGRAM(s) IV Push every 3 hours PRN Severe Pain  ondansetron Injectable 4 milliGRAM(s) IV Push every 6 hours PRN Nausea and/or Vomiting  senna 2 Tablet(s) Oral at bedtime PRN Constipation  traMADol 50 milliGRAM(s) Oral every 6 hours PRN Moderate Pain (4 - 6)      Allergies    No Known Allergies    Intolerances        REVIEW OF SYSTEMS: feels much better today   CONSTITUTIONAL: No fever, weight loss, or fatigue  EYES: No eye pain, visual disturbances, or discharge  ENMT:  No difficulty hearing, tinnitus, vertigo; No sinus or throat pain  NECK: No pain or stiffness  BREASTS: No pain, masses, or nipple discharge  RESPIRATORY: No cough, wheezing, chills or hemoptysis; No shortness of breath  CARDIOVASCULAR: No chest pain, palpitations, dizziness, or leg swelling  GASTROINTESTINAL: No abdominal or epigastric pain. No nausea, vomiting, or hematemesis; No diarrhea or constipation. No melena or hematochezia.  GENITOURINARY: No dysuria, frequency, hematuria, or incontinence  NEUROLOGICAL: No headaches, memory loss, loss of strength, numbness, or tremors  SKIN: No itching, burning, rashes, or lesions   LYMPH NODES: No enlarged glands  ENDOCRINE: No heat or cold intolerance; No hair loss  MUSCULOSKELETAL: left knee and hip pain improved   PSYCHIATRIC: No depression, anxiety, mood swings, or difficulty sleeping  HEME/LYMPH: No easy bruising, or bleeding gums  ALLERGY AND IMMUNOLOGIC: No hives or eczema    Vital Signs Last 24 Hrs  T(C): 37.2 (30 Nov 2017 13:32), Max: 37.2 (30 Nov 2017 05:40)  T(F): 98.9 (30 Nov 2017 13:32), Max: 99 (30 Nov 2017 05:40)  HR: 69 (30 Nov 2017 12:00) (67 - 93)  BP: 102/50 (30 Nov 2017 12:00) (84/39 - 127/87)  BP(mean): --  RR: 16 (30 Nov 2017 12:00) (15 - 18)  SpO2: 98% (30 Nov 2017 12:00) (96% - 100%)    PHYSICAL EXAM:  GENERAL: NAD, well-groomed, well-developed  HEAD:  Atraumatic, Normocephalic  EYES: EOMI, PERRLA, conjunctiva and sclera clear  ENMT: No tonsillar erythema, exudates, or enlargement; Moist mucous membranes, Good dentition, No lesions  NECK: Supple, No JVD, Normal thyroid  NERVOUS SYSTEM:  Alert & Oriented X3, Good concentration; Motor Strength 5/5 B/L upper and lower extremities; DTRs 2+ intact and symmetric  CHEST/LUNG: Clear to percussion bilaterally; No rales, rhonchi, wheezing, or rubs  HEART: Regular rate and rhythm; No murmurs, rubs, or gallops  ABDOMEN: Soft, Nontender, Nondistended; Bowel sounds present  EXTREMITIES:  2+ Peripheral Pulses, No clubbing, cyanosis, or edema left  hip bandage   LYMPH: No lymphadenopathy noted  SKIN: No rashes or lesions    LABS:                        10.4   8.6   )-----------( 207      ( 30 Nov 2017 10:35 )             30.9     11-30    141  |  108  |  16  ----------------------------<  123<H>  3.8   |  25  |  0.60    Ca    7.1<L>      30 Nov 2017 10:35  Phos  2.1     11-30  Mg     2.2     11-30    TPro  4.9<L>  /  Alb  2.3<L>  /  TBili  0.3  /  DBili  x   /  AST  37  /  ALT  27  /  AlkPhos  41  11-29        CAPILLARY BLOOD GLUCOSE          RADIOLOGY & ADDITIONAL TESTS:    Imaging Personally Reviewed:  [X ] YES  [ ] NO    Consultant(s) Notes Reviewed:  [ X] YES  [ ] NO    Care Discussed with Consultants/Other Providers [ X] YES  [ ] NO
INTERVAL HPI/OVERNIGHT EVENTS:  Pt. seen and examined at bedside resting comfortably s/p LEFT JONA. Patient c/o postop pain, well controlled with medication. Tolerating liquids, denies abdominal pain, N/V. +Flatus. Voided postop.   Denies fevers, cp, sob, cough, dizziness, weakness, paresthesias.     Vital Signs Last 24 Hrs  T(C): 36.1 (28 Nov 2017 22:30), Max: 37 (28 Nov 2017 00:30)  T(F): 97 (28 Nov 2017 22:30), Max: 98.6 (28 Nov 2017 00:30)  HR: 88 (28 Nov 2017 22:30) (58 - 88)  BP: 121/60 (28 Nov 2017 22:30) (98/57 - 127/69)  RR: 16 (28 Nov 2017 22:30) (12 - 17)  SpO2: 95% (28 Nov 2017 22:30) (95% - 100%)    PHYSICAL EXAM:    GENERAL: NAD  HEAD:  Atraumatic, Normocephalic  CHEST/LUNG: Clear to ausculation, bilaterally. Nonlabored respirations.  HEART: S1S2, RRR  ABDOMEN: non distended, Soft, non tender.  LEFT HIP: Wound Clean and dry with dressing intact. +SILT. Mildly tender. NO hematoma.  EXTREMITIES: +SILT throughout left leg/foot. +flex/ext left knee. +dorsiflex/plantarflex left ankle. +EHL. +FHL. Strength 5/5 b/l. 2+ peripheral pulses b/l. Calf soft, nontender. IPCs in place b/l.      I&O's Detail    27 Nov 2017 07:01  -  28 Nov 2017 07:00  --------------------------------------------------------  IN:    Oral Fluid: 320 mL    sodium chloride 0.45%: 500 mL  Total IN: 820 mL    OUT:  Total OUT: 0 mL    Total NET: 820 mL      28 Nov 2017 07:01  -  28 Nov 2017 22:48  --------------------------------------------------------  IN:    lactated ringers.: 75 mL    sodium chloride 0.45%: 600 mL    Solution: 100 mL  Total IN: 775 mL    OUT:    Voided: 200 mL  Total OUT: 200 mL    Total NET: 575 mL    LABS:                        11.2   11.4  )-----------( 265      ( 28 Nov 2017 19:50 )             34.1     11-28    140  |  106  |  20  ----------------------------<  95  4.4   |  27  |  0.65    Ca    7.5<L>      28 Nov 2017 19:50  Phos  2.5     11-28  Mg     2.3     11-28      PT/INR - ( 28 Nov 2017 06:27 )   PT: 12.0 sec;   INR: 1.10 ratio    PTT - ( 28 Nov 2017 06:27 )  PTT:27.9 sec    Impression: 83yo Female admitted with Left Hip pain due to previous fracture s/p LEFT JONA, POD 0, postop stable.     Plan:  -Advance as tolerated  -IVF  -Ancef x2 doses  -DVT ppx with Lovenox 40mg, to start in AM  -pain management prn  -PT, OOB, Ambulating, encourage incentive spirometer  -continue local wound care  -Continue medical management/supportive care  -f/u AM labs  -F/u XRAY pelvis/hip  -will discuss pt. with surgical attending
INTERVAL HPI/OVERNIGHT EVENTS:  Pt. seen and examined at bedside. Pt. c/p postop hip and upper thigh pain, pain controlled with medication, just received pain med roughly 30min prior to seeing patient. Denies N/V, tolerating liquids. +Flatus. Voiding. Will see PT today. Denies paresthesias, weakness, or new complaints.     Vital Signs Last 24 Hrs  T(C): 36.6 (29 Nov 2017 03:30), Max: 36.8 (28 Nov 2017 07:37)  T(F): 97.8 (29 Nov 2017 03:30), Max: 98.2 (28 Nov 2017 07:37)  HR: 75 (29 Nov 2017 03:30) (58 - 88)  BP: 97/50 (29 Nov 2017 03:30) (97/50 - 127/69)  RR: 16 (29 Nov 2017 03:30) (12 - 17)  SpO2: 99% (29 Nov 2017 03:30) (95% - 100%)    PHYSICAL EXAM:    GENERAL: Crying in pain with movement.  HEAD:  Atraumatic, Normocephalic  CHEST/LUNG: Clear to ausculation, bilaterally. Nonlabored respirations.  HEART: S1S2, RRR  ABDOMEN: non distended, Soft, non tender.  LEFT HIP: Wound Clean and dry with dressing intact. +SILT. +tender. +Swelling to left hip/thigh noted.  EXTREMITIES: +SILT throughout left leg/foot. +flex/ext left knee. +dorsiflex/plantarflex left ankle. +EHL. +FHL. Strength 5/5 b/l. 2+ peripheral pulses b/l. Calf soft, nontender. IPCs in place b/l.      I&O's Detail    27 Nov 2017 07:01  -  28 Nov 2017 07:00  --------------------------------------------------------  IN:    Oral Fluid: 320 mL    sodium chloride 0.45%: 500 mL  Total IN: 820 mL    OUT:  Total OUT: 0 mL    Total NET: 820 mL      28 Nov 2017 07:01  -  29 Nov 2017 06:05  --------------------------------------------------------  IN:    lactated ringers.: 75 mL    sodium chloride 0.45%: 600 mL    Solution: 50 mL    Solution: 200 mL  Total IN: 925 mL    OUT:    Voided: 500 mL  Total OUT: 500 mL    Total NET: 425 mL    LABS:                        11.2   11.4  )-----------( 265      ( 28 Nov 2017 19:50 )             34.1     11-28    140  |  106  |  20  ----------------------------<  95  4.4   |  27  |  0.65    Ca    7.5<L>      28 Nov 2017 19:50  Phos  2.5     11-28  Mg     2.3     11-28      PT/INR - ( 28 Nov 2017 06:27 )   PT: 12.0 sec;   INR: 1.10 ratio    PTT - ( 28 Nov 2017 06:27 )  PTT:27.9 sec    RADIOLOGY & ADDITIONAL STUDIES:  pending official read    Impression: 81yo Female admitted with Left Hip pain due to previous fracture s/p LEFT JONA, POD #1, postop stable.     Plan:  -Reg. diet as tolerated  -DVT ppx with Lovenox 40mg, to start this morning  -pain management prn  -PT eval  -WBAT, encourage incentive spirometer  -continue local wound care  -Continue medical management/supportive care  -Appreciate medical follow up  -f/u AM labs, trend WBC  -F/u XRAY pelvis/hip  -D/C planning once cleared by PT/SW/CM  -will discuss pt. with surgical attending
ORTHO PROGRESS HPI:  Pt seen and examined at bedside. Incisional pain is well controlled on pain medication, improving. States numbness and tingling in LLE on thigh is the same as before surgery. Denies chest tightness. Pt tolerating diet. Pt denies nausea and vomiting. Voiding well. Pt denies chest pain, SOB, dizziness, fever, chills. Ambulated with PT yesterday.      Vital Signs Last 24 Hrs  T(C): 37.5 (30 Nov 2017 23:55), Max: 37.5 (30 Nov 2017 23:55)  T(F): 99.5 (30 Nov 2017 23:55), Max: 99.5 (30 Nov 2017 23:55)  HR: 78 (30 Nov 2017 23:55) (69 - 78)  BP: 108/53 (30 Nov 2017 23:55) (102/50 - 127/87)  BP(mean): --  RR: 18 (30 Nov 2017 23:55) (16 - 18)  SpO2: 94% (30 Nov 2017 23:55) (94% - 100%)      PHYSICAL EXAM:  GENERAL: NAD  HEAD:  Atraumatic, Normocephalic  CHEST/LUNG: Clear to ausculation, bilaterally   HEART: RRR S1S2  ABDOMEN: non distended, +BS, soft, non tender, no guarding  EXTREMITIES: Prenio dressing clean/dry/intact. Pulse, motor, sensory intact. Abduction pillow in place. NV intact. No deficits. calf soft, non tender b/l    I&O's Detail    29 Nov 2017 07:01  -  30 Nov 2017 07:00  --------------------------------------------------------  IN:  Total IN: 0 mL    OUT:    Voided: 600 mL  Total OUT: 600 mL    Total NET: -600 mL          LABS:                        10.4   8.6   )-----------( 207      ( 30 Nov 2017 10:35 )             30.9     11-30    141  |  108  |  16  ----------------------------<  123<H>  3.8   |  25  |  0.60    Ca    7.1<L>      30 Nov 2017 10:35  Phos  2.1     11-30  Mg     2.2     11-30    TPro  4.9<L>  /  Alb  2.3<L>  /  TBili  0.3  /  DBili  x   /  AST  37  /  ALT  27  /  AlkPhos  41  11-29        Assessment: 82 year old female s/p L JONA POD#3 with acute blood loss anemia s/p 2uPRBC    Plan:  -pain management prn  -continue DVT prophylaxis, OOB, Ambulating with PT  -continue incentive spirometer  -continue local wound care  -f/u labs  -d/c planning   -will discuss pt with surgical attending
ORTHO PROGRESS HPI:  Pt seen and examined at bedside. Incisional pain is well controlled on pain medication. States numbness and tingling in LLE on thigh is the same as before surgery. States chest tightness is improving although still present. Pt tolerating diet. Pt denies nausea and vomiting. Voiding well. Pt denies chest pain, SOB, dizziness, fever, chills. Ambulated with PT yesterday.      Vital Signs Last 24 Hrs  T(C): 37.2 (30 Nov 2017 05:40), Max: 37.2 (30 Nov 2017 05:40)  T(F): 99 (30 Nov 2017 05:40), Max: 99 (30 Nov 2017 05:40)  HR: 78 (30 Nov 2017 05:40) (67 - 93)  BP: 108/60 (30 Nov 2017 05:40) (84/39 - 414/55)  BP(mean): --  RR: 18 (30 Nov 2017 05:40) (15 - 18)  SpO2: 100% (30 Nov 2017 05:40) (96% - 100%)      PHYSICAL EXAM:    GENERAL: NAD  HEAD:  Atraumatic, Normocephalic  CHEST/LUNG: Clear to ausculation, bilaterally   HEART: RRR S1S2  ABDOMEN: non distended, +BS, soft, non tender, no guarding  EXTREMITIES: Prenio dressing clean/dry/intact. Pulse, motor, sensory intact. Abduction pillow in place. NV intact. No deficits. calf soft, non tender b/l    I&O's Detail    28 Nov 2017 07:01  -  29 Nov 2017 07:00  --------------------------------------------------------  IN:    lactated ringers.: 75 mL    sodium chloride 0.45%: 600 mL    Solution: 50 mL    Solution: 200 mL  Total IN: 925 mL    OUT:    Voided: 500 mL  Total OUT: 500 mL    Total NET: 425 mL      29 Nov 2017 07:01  -  30 Nov 2017 05:59  --------------------------------------------------------  IN:  Total IN: 0 mL    OUT:    Voided: 600 mL  Total OUT: 600 mL    Total NET: -600 mL          LABS:                        7.9    8.6   )-----------( 211      ( 29 Nov 2017 19:54 )             23.0     11-29    138  |  105  |  19  ----------------------------<  92  3.9   |  27  |  0.69    Ca    7.0<L>      29 Nov 2017 19:54  Phos  1.8     11-29  Mg     2.1     11-29    TPro  4.9<L>  /  Alb  2.3<L>  /  TBili  0.3  /  DBili  x   /  AST  37  /  ALT  27  /  AlkPhos  41  11-29    PT/INR - ( 28 Nov 2017 06:27 )   PT: 12.0 sec;   INR: 1.10 ratio         PTT - ( 28 Nov 2017 06:27 )  PTT:27.9 sec        Assessment: 82 year old female s/p L JONA POD#2 with acute blood loss anemia.     Plan:  -f/u post transfusion cbc, remote tele, O2  -pain management prn  -continue DVT prophylaxis, OOB, Ambulating with PT  -continue incentive spirometer  -continue local wound care  -f/u labs  -will discuss pt with surgical attending
Patient is a 82y old  Female who presents with a chief complaint of left hip pain (25 Nov 2017 20:12)      INTERVAL HPI/OVERNIGHT EVENTS:    MEDICATIONS  (STANDING):  busPIRone 7.5 milliGRAM(s) Oral two times a day  donepezil 5 milliGRAM(s) Oral at bedtime  enoxaparin Injectable 40 milliGRAM(s) SubCutaneous daily  levothyroxine 50 MICROGram(s) Oral daily  mupirocin 2% Ointment 1 Application(s) Topical two times a day    MEDICATIONS  (PRN):  acetaminophen   Tablet. 650 milliGRAM(s) Oral every 6 hours PRN Mild Pain (1 - 3)  ondansetron Injectable 4 milliGRAM(s) IV Push every 8 hours PRN Nausea and/or Vomiting  oxyCODONE    IR 10 milliGRAM(s) Oral every 6 hours PRN Moderate Pain (4 - 6)      Allergies    No Known Allergies    Intolerances        REVIEW OF SYSTEMS:  CONSTITUTIONAL: No fever, weight loss, or fatigue  EYES: No eye pain, visual disturbances, or discharge  ENMT:  No difficulty hearing, tinnitus, vertigo; No sinus or throat pain  NECK: No pain or stiffness  BREASTS: No pain, masses, or nipple discharge  RESPIRATORY: No cough, wheezing, chills or hemoptysis; No shortness of breath  CARDIOVASCULAR: No chest pain, palpitations, dizziness, or leg swelling  GASTROINTESTINAL: No abdominal or epigastric pain. No nausea, vomiting, or hematemesis; No diarrhea or constipation. No melena or hematochezia.  GENITOURINARY: No dysuria, frequency, hematuria, or incontinence  NEUROLOGICAL: No headaches, memory loss, loss of strength, numbness, or tremors  SKIN: No itching, burning, rashes, or lesions   LYMPH NODES: No enlarged glands  ENDOCRINE: No heat or cold intolerance; No hair loss  MUSCULOSKELETAL: No joint pain or swelling; No muscle, back, or extremity pain  PSYCHIATRIC: No depression, anxiety, mood swings, or difficulty sleeping  HEME/LYMPH: No easy bruising, or bleeding gums  ALLERGY AND IMMUNOLOGIC: No hives or eczema    Vital Signs Last 24 Hrs  T(C): 36.8 (27 Nov 2017 12:25), Max: 37.3 (26 Nov 2017 18:05)  T(F): 98.3 (27 Nov 2017 12:25), Max: 99.2 (26 Nov 2017 18:05)  HR: 74 (27 Nov 2017 12:25) (74 - 86)  BP: 127/56 (27 Nov 2017 12:25) (110/53 - 127/56)  BP(mean): --  RR: 16 (27 Nov 2017 12:25) (16 - 17)  SpO2: 100% (27 Nov 2017 12:25) (95% - 100%)    PHYSICAL EXAM:  GENERAL: NAD, well-groomed, well-developed  HEAD:  Atraumatic, Normocephalic  EYES: EOMI, PERRLA, conjunctiva and sclera clear  ENMT: No tonsillar erythema, exudates, or enlargement; Moist mucous membranes, Good dentition, No lesions  NECK: Supple, No JVD, Normal thyroid  NERVOUS SYSTEM:  Alert & Oriented X3, Good concentration; Motor Strength 5/5 B/L upper and lower extremities; DTRs 2+ intact and symmetric  CHEST/LUNG: Clear to percussion bilaterally; No rales, rhonchi, wheezing, or rubs  HEART: Regular rate and rhythm; No murmurs, rubs, or gallops  ABDOMEN: Soft, Nontender, Nondistended; Bowel sounds present  EXTREMITIES:  2+ Peripheral Pulses, No clubbing, cyanosis, or edema  LYMPH: No lymphadenopathy noted  SKIN: No rashes or lesions    LABS:                        11.5   7.6   )-----------( 280      ( 27 Nov 2017 06:22 )             35.0     11-27    140  |  106  |  24<H>  ----------------------------<  87  4.1   |  27  |  0.68    Ca    8.3<L>      27 Nov 2017 06:22    TPro  6.8  /  Alb  3.2<L>  /  TBili  0.3  /  DBili  x   /  AST  23  /  ALT  32  /  AlkPhos  63  11-25    PT/INR - ( 25 Nov 2017 13:24 )   PT: 11.2 sec;   INR: 1.03 ratio         PTT - ( 25 Nov 2017 13:24 )  PTT:27.0 sec    CAPILLARY BLOOD GLUCOSE          RADIOLOGY & ADDITIONAL TESTS:    Imaging Personally Reviewed:  [ ] YES  [ ] NO    Consultant(s) Notes Reviewed:  [ ] YES  [ ] NO    Care Discussed with Consultants/Other Providers [ ] YES  [ ] NO
Patient is a 82y old  Female who presents with a chief complaint of left hip pain (25 Nov 2017 20:12)      INTERVAL HPI/OVERNIGHT EVENTS: surgery currently delayed  nausea with morphine and Oxycodone     MEDICATIONS  (STANDING):  busPIRone 7.5 milliGRAM(s) Oral two times a day  donepezil 5 milliGRAM(s) Oral at bedtime  ketorolac   Injectable 15 milliGRAM(s) IV Push once  levothyroxine 50 MICROGram(s) Oral daily  mupirocin 2% Ointment 1 Application(s) Topical two times a day  sodium chloride 0.45%. 1000 milliLiter(s) (75 mL/Hr) IV Continuous <Continuous>    MEDICATIONS  (PRN):  acetaminophen   Tablet. 650 milliGRAM(s) Oral every 6 hours PRN Mild Pain (1 - 3)  ondansetron Injectable 4 milliGRAM(s) IV Push every 8 hours PRN Nausea and/or Vomiting      Allergies    No Known Allergies    Intolerances        REVIEW OF SYSTEMS:  CONSTITUTIONAL: No fever, weight loss, or fatigue  EYES: No eye pain, visual disturbances, or discharge  ENMT:  No difficulty hearing, tinnitus, vertigo; No sinus or throat pain  NECK: No pain or stiffness  BREASTS: No pain, masses, or nipple discharge  RESPIRATORY: No cough, wheezing, chills or hemoptysis; No shortness of breath  CARDIOVASCULAR: No chest pain, palpitations, dizziness, or leg swelling  GASTROINTESTINAL: No abdominal or epigastric pain. No nausea, vomiting, or hematemesis; No diarrhea or constipation. No melena or hematochezia.  GENITOURINARY: No dysuria, frequency, hematuria, or incontinence  NEUROLOGICAL: No headaches, memory loss, loss of strength, numbness, or tremors  SKIN: No itching, burning, rashes, or lesions   LYMPH NODES: No enlarged glands  ENDOCRINE: No heat or cold intolerance; No hair loss  MUSCULOSKELETAL:left hip pain   PSYCHIATRIC: No depression, anxiety, mood swings, or difficulty sleeping  HEME/LYMPH: No easy bruising, or bleeding gums  ALLERGY AND IMMUNOLOGIC: No hives or eczema    Vital Signs Last 24 Hrs  T(C): 36.8 (28 Nov 2017 07:37), Max: 37.2 (27 Nov 2017 18:15)  T(F): 98.2 (28 Nov 2017 07:37), Max: 98.9 (27 Nov 2017 18:15)  HR: 66 (28 Nov 2017 07:37) (66 - 80)  BP: 116/60 (28 Nov 2017 07:37) (116/60 - 135/63)  BP(mean): --  RR: 16 (28 Nov 2017 07:37) (16 - 20)  SpO2: 97% (28 Nov 2017 07:37) (95% - 97%)    PHYSICAL EXAM:  GENERAL: NAD, well-groomed, well-developed  HEAD:  Atraumatic, Normocephalic  EYES: EOMI, PERRLA, conjunctiva and sclera clear  ENMT: No tonsillar erythema, exudates, or enlargement; Moist mucous membranes, Good dentition, No lesions  NECK: Supple, No JVD, Normal thyroid  NERVOUS SYSTEM:  Alert & Oriented X3, Good concentration; Motor Strength 5/5 B/L upper and lower extremities; DTRs 2+ intact and symmetric  CHEST/LUNG: Clear to percussion bilaterally; No rales, rhonchi, wheezing, or rubs  HEART: Regular rate and rhythm; No murmurs, rubs, or gallops  ABDOMEN: Soft, Nontender, Nondistended; Bowel sounds present  EXTREMITIES: tender left hip   LYMPH: No lymphadenopathy noted  SKIN: No rashes or lesions    LABS:                        11.6   6.6   )-----------( 267      ( 28 Nov 2017 06:27 )             36.0     11-28    140  |  106  |  19  ----------------------------<  79  4.0   |  28  |  0.54    Ca    8.3<L>      28 Nov 2017 06:27      PT/INR - ( 28 Nov 2017 06:27 )   PT: 12.0 sec;   INR: 1.10 ratio         PTT - ( 28 Nov 2017 06:27 )  PTT:27.9 sec    CAPILLARY BLOOD GLUCOSE          RADIOLOGY & ADDITIONAL TESTS:    Imaging Personally Reviewed:  [X ] YES  [ ] NO    Consultant(s) Notes Reviewed:  [X ] YES  [ ] NO    Care Discussed with Consultants/Other Providers [X ] YES  [ ] NO
Patient is a 82y old  Female who presents with a chief complaint of left hip pain (28 Nov 2017 23:05)      INTERVAL HPI/OVERNIGHT EVENTS: POD 1 hip replacement complains of dyspepsia     MEDICATIONS  (STANDING):  acetaminophen   Tablet 650 milliGRAM(s) Oral every 6 hours  busPIRone 7.5 milliGRAM(s) Oral two times a day  celecoxib 200 milliGRAM(s) Oral with breakfast  docusate sodium 100 milliGRAM(s) Oral three times a day  donepezil 5 milliGRAM(s) Oral at bedtime  enoxaparin Injectable 40 milliGRAM(s) SubCutaneous every 24 hours  lactated ringers. 1000 milliLiter(s) (100 mL/Hr) IV Continuous <Continuous>  levothyroxine 50 MICROGram(s) Oral daily  multivitamin 1 Tablet(s) Oral daily  mupirocin 2% Ointment 1 Application(s) Topical two times a day    MEDICATIONS  (PRN):  aluminum hydroxide/magnesium hydroxide/simethicone Suspension 30 milliLiter(s) Oral four times a day PRN Indigestion  ketorolac   Injectable 30 milliGRAM(s) IV Push once PRN Severe Pain (7 - 10)  morphine  - Injectable 2 milliGRAM(s) IV Push every 3 hours PRN Severe Pain  ondansetron Injectable 4 milliGRAM(s) IV Push every 6 hours PRN Nausea and/or Vomiting  senna 2 Tablet(s) Oral at bedtime PRN Constipation  traMADol 50 milliGRAM(s) Oral every 6 hours PRN Moderate Pain (4 - 6)      Allergies    No Known Allergies    Intolerances        REVIEW OF SYSTEMS:  CONSTITUTIONAL: No fever, weight loss, or fatigue  EYES: No eye pain, visual disturbances, or discharge  ENMT:  No difficulty hearing, tinnitus, vertigo; No sinus or throat pain  NECK: No pain or stiffness  BREASTS: No pain, masses, or nipple discharge  RESPIRATORY: No cough, wheezing, chills or hemoptysis; No shortness of breath  CARDIOVASCULAR: No chest pain, palpitations, dizziness, or leg swelling  GASTROINTESTINAL: dyspepsia   GENITOURINARY: No dysuria, frequency, hematuria, or incontinence  NEUROLOGICAL: No headaches, memory loss, loss of strength, numbness, or tremors  SKIN: No itching, burning, rashes, or lesions   LYMPH NODES: No enlarged glands  ENDOCRINE: No heat or cold intolerance; No hair loss  MUSCULOSKELETAL: left knee pain PSYCHIATRIC: No depression, anxiety, mood swings, or difficulty sleeping  HEME/LYMPH: No easy bruising, or bleeding gums  ALLERGY AND IMMUNOLOGIC: No hives or eczema    Vital Signs Last 24 Hrs  T(C): 36.2 (29 Nov 2017 12:47), Max: 36.8 (28 Nov 2017 15:35)  T(F): 97.2 (29 Nov 2017 12:47), Max: 98.2 (28 Nov 2017 15:35)  HR: 78 (29 Nov 2017 12:47) (69 - 88)  BP: 119/41 (29 Nov 2017 12:47) (97/50 - 414/55)  BP(mean): --  RR: 15 (29 Nov 2017 12:47) (12 - 17)  SpO2: 98% (29 Nov 2017 12:47) (95% - 100%)    PHYSICAL EXAM:  GENERAL: NAD, well-groomed, well-developed  HEAD:  Atraumatic, Normocephalic  EYES: EOMI, PERRLA, conjunctiva and sclera clear  ENMT: No tonsillar erythema, exudates, or enlargement; Moist mucous membranes, Good dentition, No lesions  NECK: Supple, No JVD, Normal thyroid  NERVOUS SYSTEM:  Alert & Oriented X3, Good concentration; Motor Strength 5/5 B/L upper and lower extremities; DTRs 2+ intact and symmetric  CHEST/LUNG: Clear to percussion bilaterally; No rales, rhonchi, wheezing, or rubs  HEART: Regular rate and rhythm; No murmurs, rubs, or gallops  ABDOMEN: Soft, Nontender, Nondistended; Bowel sounds present  EXTREMITIES:  2+ Peripheral Pulses, No clubbing, cyanosis, or edema  LYMPH: No lymphadenopathy noted  SKIN: No rashes or lesions    LABS:                        9.1    11.3  )-----------( 250      ( 29 Nov 2017 06:26 )             27.3     11-29    138  |  104  |  22  ----------------------------<  117<H>  4.5   |  25  |  0.67    Ca    7.2<L>      29 Nov 2017 06:26  Phos  2.5     11-28  Mg     2.3     11-28      PT/INR - ( 28 Nov 2017 06:27 )   PT: 12.0 sec;   INR: 1.10 ratio         PTT - ( 28 Nov 2017 06:27 )  PTT:27.9 sec    CAPILLARY BLOOD GLUCOSE          RADIOLOGY & ADDITIONAL TESTS:    Imaging Personally Reviewed:  [X ] YES  [ ] NO    Consultant(s) Notes Reviewed:  [X ] YES  [ ] NO    Care Discussed with Consultants/Other Providers [ X] YES  [ ] NO
Patient seen and examined at bedside in no distress.  Complaining of lack of sleep due to roommate.   Admits to left hip pain.  Admits to nausea after receiving morphine.  Denies fever, chills, chest pain, shortness of breath.     Vital Signs Last 24 Hrs  T(F): 99.1 (11-27-17 @ 05:20), Max: 99.2 (11-26-17 @ 18:05)  HR: 86 (11-27-17 @ 05:20)  BP: 110/55 (11-27-17 @ 05:20)  RR: 16 (11-27-17 @ 05:20)  SpO2: 95% (11-27-17 @ 05:20)    GENERAL: Alert, oriented, NAD  CHEST/LUNG: Clear to auscultation bilaterally, respirations nonlabored  HEART: S1S2, Regular rate and rhythm  ABDOMEN: Soft, NTND  EXTREMITIES: Sensation grossly intact LE b/l.+ DP/PT pulses b/l. No calf tenderness, no pedal edema b/l. FROM knees and ankles b/l.     LABS:                        11.5   7.6   )-----------( 280      ( 27 Nov 2017 06:22 )             35.0     11-27    140  |  106  |  24<H>  ----------------------------<  87  4.1   |  27  |  0.68    Ca    8.3<L>      27 Nov 2017 06:22    TPro  6.8  /  Alb  3.2<L>  /  TBili  0.3  /  DBili  x   /  AST  23  /  ALT  32  /  AlkPhos  63  11-25    PT/INR - ( 25 Nov 2017 13:24 )   PT: 11.2 sec;   INR: 1.03 ratio         PTT - ( 25 Nov 2017 13:24 )  PTT:27.0 sec    Impression: 82 year old female PMH COPD, hypothyroidism, with left hip pain due to previous fracture and fall	  Plan:  - preop for tomorrow  - medical consult appreciated; optimized for OR tomorrow  - discussed with admitting about moving patient to 1E today, will follow up   - pain control PRN  - continue nwb LLE  - DVT ppx with lovenox, to be held Tues AM  - will d/w Dr. Rodriguez
Patient seen and examined at bedside in no distress.  Reports left hip pain.  Is otherwise without complaints.  Denies fever, chills, chest pain, shortness of breath.  Voiding without difficulty.    Vital Signs Last 24 Hrs  T(F): 98.8 (11-26-17 @ 05:05), Max: 98.8 (11-26-17 @ 05:05)  HR: 77 (11-26-17 @ 05:05)  BP: 114/64 (11-26-17 @ 05:05)  RR: 18 (11-26-17 @ 05:05)  SpO2: 97% (11-26-17 @ 05:05)    GENERAL: Alert, oriented, NAD  CHEST/LUNG: Clear to auscultation bilaterally, respirations nonlabored  HEART: S1S2, Regular rate and rhythm  ABDOMEN: Soft, NTND  EXTREMITIES: + DP/PT pulses b/l. Sensation grossly intact LE b/l. No calf tenderness, no pedal edema b/l. FROM knees b/l.       LABS:                        11.8   6.8   )-----------( 280      ( 26 Nov 2017 06:39 )             36.0     11-26    140  |  105  |  26<H>  ----------------------------<  80  4.2   |  28  |  0.82    Ca    8.5      26 Nov 2017 06:39    TPro  6.8  /  Alb  3.2<L>  /  TBili  0.3  /  DBili  x   /  AST  23  /  ALT  32  /  AlkPhos  63  11-25    PT/INR - ( 25 Nov 2017 13:24 )   PT: 11.2 sec;   INR: 1.03 ratio         PTT - ( 25 Nov 2017 13:24 )  PTT:27.0 sec    Impression: 82 year old female PMH COPD, hypothyroidism, with left hip pain due to previous fracture and fall	  Plan:  - pain control PRN  - OR planning Tuesday for [facility]  - discussed with Dr. Farah who will see the patient for medical management and preop optimization  - continue nwb LLE  - DVT ppx with lovenox, to be held Tues AM  - will d/w Dr. Rodriguez

## 2017-12-01 NOTE — PROGRESS NOTE ADULT - PROVIDER SPECIALTY LIST ADULT
Hospitalist
Orthopedics
Surgery
Surgery
Hospitalist

## 2017-12-01 NOTE — CHART NOTE - NSCHARTNOTEFT_GEN_A_CORE
AM lab review:                          10.2   7.4   )-----------( 222      ( 01 Dec 2017 07:47 )             30.9       11-30    141  |  108  |  16  ----------------------------<  123<H>  3.8   |  25  |  0.60    Ca    7.1<L>      30 Nov 2017 10:35  Phos  2.1     11-30  Mg     2.2     11-30    TPro  4.9<L>  /  Alb  2.3<L>  /  TBili  0.3  /  DBili  x   /  AST  37  /  ALT  27  /  AlkPhos  41  11-29    Impression:  blood work acceptable    Plan:  repeat phos level this am  -cleared by Dr. Armas for transfer to [facility]  - Pt was seen earlier by Dr. Jennifer UGARTE for transfer  -discharge to [facility] as soon as bed is available.

## 2017-12-06 DIAGNOSIS — E83.39 OTHER DISORDERS OF PHOSPHORUS METABOLISM: ICD-10-CM

## 2017-12-06 DIAGNOSIS — J44.9 CHRONIC OBSTRUCTIVE PULMONARY DISEASE, UNSPECIFIED: ICD-10-CM

## 2017-12-06 DIAGNOSIS — E03.9 HYPOTHYROIDISM, UNSPECIFIED: ICD-10-CM
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
## 2017-12-06 DIAGNOSIS — T84.091A OTHER MECHANICAL COMPLICATION OF INTERNAL LEFT HIP PROSTHESIS, INITIAL ENCOUNTER: ICD-10-CM

## 2017-12-06 DIAGNOSIS — F03.90 UNSPECIFIED DEMENTIA WITHOUT BEHAVIORAL DISTURBANCE: ICD-10-CM

## 2017-12-06 DIAGNOSIS — D62 ACUTE POSTHEMORRHAGIC ANEMIA: ICD-10-CM

## 2017-12-08 ENCOUNTER — OUTPATIENT (OUTPATIENT)
Dept: OUTPATIENT SERVICES | Facility: HOSPITAL | Age: 82
LOS: 1 days | Discharge: HOME | End: 2017-12-08
Payer: MEDICARE

## 2017-12-08 DIAGNOSIS — M25.552 PAIN IN LEFT HIP: Chronic | ICD-10-CM

## 2017-12-08 DIAGNOSIS — Z95.828 PRESENCE OF OTHER VASCULAR IMPLANTS AND GRAFTS: Chronic | ICD-10-CM

## 2017-12-08 DIAGNOSIS — Z98.1 ARTHRODESIS STATUS: Chronic | ICD-10-CM

## 2017-12-08 DIAGNOSIS — Z90.710 ACQUIRED ABSENCE OF BOTH CERVIX AND UTERUS: Chronic | ICD-10-CM

## 2017-12-08 DIAGNOSIS — Z96.642 PRESENCE OF LEFT ARTIFICIAL HIP JOINT: ICD-10-CM

## 2017-12-08 PROCEDURE — 93971 EXTREMITY STUDY: CPT | Mod: 26

## 2017-12-18 ENCOUNTER — OUTPATIENT (OUTPATIENT)
Dept: OUTPATIENT SERVICES | Facility: HOSPITAL | Age: 82
LOS: 1 days | Discharge: HOME | End: 2017-12-18
Payer: MEDICARE

## 2017-12-18 DIAGNOSIS — M25.552 PAIN IN LEFT HIP: Chronic | ICD-10-CM

## 2017-12-18 DIAGNOSIS — Z98.1 ARTHRODESIS STATUS: Chronic | ICD-10-CM

## 2017-12-18 DIAGNOSIS — Z95.828 PRESENCE OF OTHER VASCULAR IMPLANTS AND GRAFTS: Chronic | ICD-10-CM

## 2017-12-18 DIAGNOSIS — Z90.710 ACQUIRED ABSENCE OF BOTH CERVIX AND UTERUS: Chronic | ICD-10-CM

## 2017-12-18 DIAGNOSIS — M25.552 PAIN IN LEFT HIP: ICD-10-CM

## 2017-12-18 PROCEDURE — 73502 X-RAY EXAM HIP UNI 2-3 VIEWS: CPT | Mod: 26,LT

## 2018-06-14 NOTE — H&P ADULT - NSHPROSALLOTHERNEGRD_GEN_ALL_CORE
All other review of systems negative, except as noted in HPI major surgery, including arthroscopic and laparoscopic (greater than 1 hr)

## 2019-01-25 NOTE — PHYSICAL THERAPY INITIAL EVALUATION ADULT - GENERAL OBSERVATIONS, REHAB EVAL
Addended Clarice Glass on: 1/25/2019 02:34 PM     Modules accepted: Orders Pt was seen in supine c abduction pillow in place and c O2 at 2l/min  through nasal cannula, alert and Ox4. Pt was very anxious about performing functional activities. Pt was instructed post hip precaution prior to P.T. intervention c detrun demonstration.

## 2023-04-08 NOTE — H&P PST ADULT - REASON FOR ADMISSION
I have left hip pain Follow up with ophthalmologist as soon as possible.   Continue Polytrim eye drop.   Advance activity as tolerated.  Continue all previously prescribed medications as directed unless otherwise instructed.  Follow up with your primary care physician in 48-72 hours- bring copies of your results.  Return to the ER for worsening or persistent symptoms, and/or ANY NEW OR CONCERNING SYMPTOMS. If you have issues obtaining follow up, please call: [phone] to obtain a doctor or specialist who takes your insurance in your area.  You may call [phone] to make an appointment with the internal medicine clinic.

## 2023-10-12 NOTE — PHYSICAL THERAPY INITIAL EVALUATION ADULT - NS ASR RISK AREAS PT EVAL
We will schedule surgery at your convenience. If you have any questions regarding your surgery, please call our office and ask to speak with Jai Tidwell [phone].
fall

## 2024-12-23 NOTE — ED PROVIDER NOTE - MEDICAL DECISION MAKING DETAILS
spoke with Dr. Scott to admit for further care with his service with Surgical [facility] to put in orders for pain control and medical optimization for hip replacement to occur. 23-Sep-2024